# Patient Record
Sex: MALE | Race: BLACK OR AFRICAN AMERICAN | NOT HISPANIC OR LATINO | Employment: UNEMPLOYED | URBAN - METROPOLITAN AREA
[De-identification: names, ages, dates, MRNs, and addresses within clinical notes are randomized per-mention and may not be internally consistent; named-entity substitution may affect disease eponyms.]

---

## 2021-03-26 ENCOUNTER — IMMUNIZATION (OUTPATIENT)
Dept: PRIMARY CARE CLINIC | Facility: CLINIC | Age: 56
End: 2021-03-26
Payer: COMMERCIAL

## 2021-03-26 DIAGNOSIS — Z23 NEED FOR VACCINATION: Primary | ICD-10-CM

## 2021-03-26 PROCEDURE — 0011A COVID-19, MRNA, LNP-S, PF, 100 MCG/0.5 ML DOSE VACCINE: CPT | Mod: PBBFAC | Performed by: FAMILY MEDICINE

## 2021-04-24 ENCOUNTER — IMMUNIZATION (OUTPATIENT)
Dept: PRIMARY CARE CLINIC | Facility: CLINIC | Age: 56
End: 2021-04-24
Payer: COMMERCIAL

## 2021-04-24 DIAGNOSIS — Z23 NEED FOR VACCINATION: Primary | ICD-10-CM

## 2021-04-24 PROCEDURE — 0012A COVID-19, MRNA, LNP-S, PF, 100 MCG/0.5 ML DOSE VACCINE: CPT | Mod: PBBFAC | Performed by: EMERGENCY MEDICINE

## 2021-05-28 ENCOUNTER — RECORDS - HEALTHEAST (OUTPATIENT)
Dept: ADMINISTRATIVE | Facility: CLINIC | Age: 56
End: 2021-05-28

## 2021-10-14 ENCOUNTER — OFFICE VISIT (OUTPATIENT)
Dept: INTERNAL MEDICINE | Facility: CLINIC | Age: 56
End: 2021-10-14
Payer: COMMERCIAL

## 2021-10-14 VITALS
HEIGHT: 70 IN | WEIGHT: 255.31 LBS | BODY MASS INDEX: 36.55 KG/M2 | DIASTOLIC BLOOD PRESSURE: 62 MMHG | SYSTOLIC BLOOD PRESSURE: 119 MMHG | HEART RATE: 83 BPM | OXYGEN SATURATION: 97 %

## 2021-10-14 DIAGNOSIS — Z00.00 ENCOUNTER FOR ANNUAL PHYSICAL EXAM: Primary | ICD-10-CM

## 2021-10-14 DIAGNOSIS — E66.9 OBESITY (BMI 35.0-39.9 WITHOUT COMORBIDITY): ICD-10-CM

## 2021-10-14 DIAGNOSIS — E78.5 DYSLIPIDEMIA: ICD-10-CM

## 2021-10-14 DIAGNOSIS — Z12.11 COLON CANCER SCREENING: ICD-10-CM

## 2021-10-14 DIAGNOSIS — M25.521 RIGHT ELBOW PAIN: ICD-10-CM

## 2021-10-14 DIAGNOSIS — Z12.5 PROSTATE CANCER SCREENING: ICD-10-CM

## 2021-10-14 DIAGNOSIS — Z00.00 LABORATORY EXAMINATION ORDERED AS PART OF A ROUTINE GENERAL MEDICAL EXAMINATION: ICD-10-CM

## 2021-10-14 PROCEDURE — 3008F BODY MASS INDEX DOCD: CPT | Mod: CPTII,S$GLB,, | Performed by: INTERNAL MEDICINE

## 2021-10-14 PROCEDURE — 90471 FLU VACCINE (QUAD) GREATER THAN OR EQUAL TO 3YO PRESERVATIVE FREE IM: ICD-10-PCS | Mod: S$GLB,,, | Performed by: INTERNAL MEDICINE

## 2021-10-14 PROCEDURE — 3074F PR MOST RECENT SYSTOLIC BLOOD PRESSURE < 130 MM HG: ICD-10-PCS | Mod: CPTII,S$GLB,, | Performed by: INTERNAL MEDICINE

## 2021-10-14 PROCEDURE — 99999 PR PBB SHADOW E&M-EST. PATIENT-LVL III: ICD-10-PCS | Mod: PBBFAC,,, | Performed by: INTERNAL MEDICINE

## 2021-10-14 PROCEDURE — 3008F PR BODY MASS INDEX (BMI) DOCUMENTED: ICD-10-PCS | Mod: CPTII,S$GLB,, | Performed by: INTERNAL MEDICINE

## 2021-10-14 PROCEDURE — 1160F RVW MEDS BY RX/DR IN RCRD: CPT | Mod: CPTII,S$GLB,, | Performed by: INTERNAL MEDICINE

## 2021-10-14 PROCEDURE — 3078F DIAST BP <80 MM HG: CPT | Mod: CPTII,S$GLB,, | Performed by: INTERNAL MEDICINE

## 2021-10-14 PROCEDURE — 90686 IIV4 VACC NO PRSV 0.5 ML IM: CPT | Mod: S$GLB,,, | Performed by: INTERNAL MEDICINE

## 2021-10-14 PROCEDURE — 1160F PR REVIEW ALL MEDS BY PRESCRIBER/CLIN PHARMACIST DOCUMENTED: ICD-10-PCS | Mod: CPTII,S$GLB,, | Performed by: INTERNAL MEDICINE

## 2021-10-14 PROCEDURE — 99386 PR PREVENTIVE VISIT,NEW,40-64: ICD-10-PCS | Mod: 25,S$GLB,, | Performed by: INTERNAL MEDICINE

## 2021-10-14 PROCEDURE — 99386 PREV VISIT NEW AGE 40-64: CPT | Mod: 25,S$GLB,, | Performed by: INTERNAL MEDICINE

## 2021-10-14 PROCEDURE — 1159F MED LIST DOCD IN RCRD: CPT | Mod: CPTII,S$GLB,, | Performed by: INTERNAL MEDICINE

## 2021-10-14 PROCEDURE — 99999 PR PBB SHADOW E&M-EST. PATIENT-LVL III: CPT | Mod: PBBFAC,,, | Performed by: INTERNAL MEDICINE

## 2021-10-14 PROCEDURE — 90471 IMMUNIZATION ADMIN: CPT | Mod: S$GLB,,, | Performed by: INTERNAL MEDICINE

## 2021-10-14 PROCEDURE — 1159F PR MEDICATION LIST DOCUMENTED IN MEDICAL RECORD: ICD-10-PCS | Mod: CPTII,S$GLB,, | Performed by: INTERNAL MEDICINE

## 2021-10-14 PROCEDURE — 90686 FLU VACCINE (QUAD) GREATER THAN OR EQUAL TO 3YO PRESERVATIVE FREE IM: ICD-10-PCS | Mod: S$GLB,,, | Performed by: INTERNAL MEDICINE

## 2021-10-14 PROCEDURE — 3078F PR MOST RECENT DIASTOLIC BLOOD PRESSURE < 80 MM HG: ICD-10-PCS | Mod: CPTII,S$GLB,, | Performed by: INTERNAL MEDICINE

## 2021-10-14 PROCEDURE — 3074F SYST BP LT 130 MM HG: CPT | Mod: CPTII,S$GLB,, | Performed by: INTERNAL MEDICINE

## 2021-10-15 ENCOUNTER — LAB VISIT (OUTPATIENT)
Dept: LAB | Facility: HOSPITAL | Age: 56
End: 2021-10-15
Attending: INTERNAL MEDICINE
Payer: COMMERCIAL

## 2021-10-15 DIAGNOSIS — Z12.5 PROSTATE CANCER SCREENING: ICD-10-CM

## 2021-10-15 DIAGNOSIS — E66.9 OBESITY (BMI 35.0-39.9 WITHOUT COMORBIDITY): ICD-10-CM

## 2021-10-15 DIAGNOSIS — E78.5 DYSLIPIDEMIA: ICD-10-CM

## 2021-10-15 DIAGNOSIS — Z00.00 LABORATORY EXAMINATION ORDERED AS PART OF A ROUTINE GENERAL MEDICAL EXAMINATION: ICD-10-CM

## 2021-10-15 LAB
ALBUMIN SERPL BCP-MCNC: 3.7 G/DL (ref 3.5–5.2)
ALP SERPL-CCNC: 53 U/L (ref 55–135)
ALT SERPL W/O P-5'-P-CCNC: 26 U/L (ref 10–44)
ANION GAP SERPL CALC-SCNC: 11 MMOL/L (ref 8–16)
AST SERPL-CCNC: 19 U/L (ref 10–40)
BASOPHILS # BLD AUTO: 0.01 K/UL (ref 0–0.2)
BASOPHILS NFR BLD: 0.2 % (ref 0–1.9)
BILIRUB SERPL-MCNC: 0.7 MG/DL (ref 0.1–1)
BUN SERPL-MCNC: 15 MG/DL (ref 6–20)
CALCIUM SERPL-MCNC: 9.4 MG/DL (ref 8.7–10.5)
CHLORIDE SERPL-SCNC: 107 MMOL/L (ref 95–110)
CHOLEST SERPL-MCNC: 241 MG/DL (ref 120–199)
CHOLEST/HDLC SERPL: 5.7 {RATIO} (ref 2–5)
CO2 SERPL-SCNC: 22 MMOL/L (ref 23–29)
COMPLEXED PSA SERPL-MCNC: 0.51 NG/ML (ref 0–4)
CREAT SERPL-MCNC: 1.3 MG/DL (ref 0.5–1.4)
DIFFERENTIAL METHOD: ABNORMAL
EOSINOPHIL # BLD AUTO: 0.2 K/UL (ref 0–0.5)
EOSINOPHIL NFR BLD: 4.3 % (ref 0–8)
ERYTHROCYTE [DISTWIDTH] IN BLOOD BY AUTOMATED COUNT: 14 % (ref 11.5–14.5)
EST. GFR  (AFRICAN AMERICAN): >60 ML/MIN/1.73 M^2
EST. GFR  (NON AFRICAN AMERICAN): >60 ML/MIN/1.73 M^2
ESTIMATED AVG GLUCOSE: 114 MG/DL (ref 68–131)
GLUCOSE SERPL-MCNC: 95 MG/DL (ref 70–110)
HBA1C MFR BLD: 5.6 % (ref 4–5.6)
HCT VFR BLD AUTO: 42.9 % (ref 40–54)
HCV AB SERPL QL IA: NEGATIVE
HDLC SERPL-MCNC: 42 MG/DL (ref 40–75)
HDLC SERPL: 17.4 % (ref 20–50)
HGB BLD-MCNC: 13.5 G/DL (ref 14–18)
HIV 1+2 AB+HIV1 P24 AG SERPL QL IA: NEGATIVE
IMM GRANULOCYTES # BLD AUTO: 0.01 K/UL (ref 0–0.04)
IMM GRANULOCYTES NFR BLD AUTO: 0.2 % (ref 0–0.5)
LDLC SERPL CALC-MCNC: 176.2 MG/DL (ref 63–159)
LYMPHOCYTES # BLD AUTO: 2.1 K/UL (ref 1–4.8)
LYMPHOCYTES NFR BLD: 45.1 % (ref 18–48)
MCH RBC QN AUTO: 28.1 PG (ref 27–31)
MCHC RBC AUTO-ENTMCNC: 31.5 G/DL (ref 32–36)
MCV RBC AUTO: 89 FL (ref 82–98)
MONOCYTES # BLD AUTO: 0.4 K/UL (ref 0.3–1)
MONOCYTES NFR BLD: 8.6 % (ref 4–15)
NEUTROPHILS # BLD AUTO: 1.9 K/UL (ref 1.8–7.7)
NEUTROPHILS NFR BLD: 41.6 % (ref 38–73)
NONHDLC SERPL-MCNC: 199 MG/DL
NRBC BLD-RTO: 0 /100 WBC
PLATELET # BLD AUTO: 202 K/UL (ref 150–450)
PMV BLD AUTO: 11.3 FL (ref 9.2–12.9)
POTASSIUM SERPL-SCNC: 4.5 MMOL/L (ref 3.5–5.1)
PROT SERPL-MCNC: 6.8 G/DL (ref 6–8.4)
RBC # BLD AUTO: 4.81 M/UL (ref 4.6–6.2)
SODIUM SERPL-SCNC: 140 MMOL/L (ref 136–145)
TRIGL SERPL-MCNC: 114 MG/DL (ref 30–150)
TSH SERPL DL<=0.005 MIU/L-ACNC: 2.02 UIU/ML (ref 0.4–4)
WBC # BLD AUTO: 4.66 K/UL (ref 3.9–12.7)

## 2021-10-15 PROCEDURE — 80053 COMPREHEN METABOLIC PANEL: CPT | Performed by: INTERNAL MEDICINE

## 2021-10-15 PROCEDURE — 84153 ASSAY OF PSA TOTAL: CPT | Performed by: INTERNAL MEDICINE

## 2021-10-15 PROCEDURE — 80061 LIPID PANEL: CPT | Performed by: INTERNAL MEDICINE

## 2021-10-15 PROCEDURE — 85025 COMPLETE CBC W/AUTO DIFF WBC: CPT | Performed by: INTERNAL MEDICINE

## 2021-10-15 PROCEDURE — 87389 HIV-1 AG W/HIV-1&-2 AB AG IA: CPT | Performed by: INTERNAL MEDICINE

## 2021-10-15 PROCEDURE — 36415 COLL VENOUS BLD VENIPUNCTURE: CPT | Performed by: INTERNAL MEDICINE

## 2021-10-15 PROCEDURE — 84443 ASSAY THYROID STIM HORMONE: CPT | Performed by: INTERNAL MEDICINE

## 2021-10-15 PROCEDURE — 86803 HEPATITIS C AB TEST: CPT | Performed by: INTERNAL MEDICINE

## 2021-10-15 PROCEDURE — 83036 HEMOGLOBIN GLYCOSYLATED A1C: CPT | Performed by: INTERNAL MEDICINE

## 2021-11-19 ENCOUNTER — PATIENT MESSAGE (OUTPATIENT)
Dept: ENDOSCOPY | Facility: HOSPITAL | Age: 56
End: 2021-11-19
Payer: COMMERCIAL

## 2022-01-04 DIAGNOSIS — U07.1 COVID-19 VIRUS DETECTED: ICD-10-CM

## 2023-08-04 ENCOUNTER — HOSPITAL ENCOUNTER (OUTPATIENT)
Dept: RADIOLOGY | Facility: HOSPITAL | Age: 58
Discharge: HOME | End: 2023-08-04
Attending: INTERNAL MEDICINE
Payer: COMMERCIAL

## 2023-08-04 ENCOUNTER — OFFICE VISIT (OUTPATIENT)
Dept: INTERNAL MEDICINE | Facility: CLINIC | Age: 58
End: 2023-08-04
Payer: COMMERCIAL

## 2023-08-04 VITALS
WEIGHT: 257.94 LBS | BODY MASS INDEX: 36.93 KG/M2 | SYSTOLIC BLOOD PRESSURE: 120 MMHG | HEART RATE: 76 BPM | OXYGEN SATURATION: 96 % | DIASTOLIC BLOOD PRESSURE: 70 MMHG | HEIGHT: 70 IN

## 2023-08-04 DIAGNOSIS — Z12.11 SCREENING FOR COLON CANCER: ICD-10-CM

## 2023-08-04 DIAGNOSIS — Z00.00 ENCOUNTER FOR ANNUAL PHYSICAL EXAM: Primary | ICD-10-CM

## 2023-08-04 DIAGNOSIS — Z23 NEED FOR TDAP VACCINATION: ICD-10-CM

## 2023-08-04 DIAGNOSIS — Z01.89 LABORATORY EXAMINATION: ICD-10-CM

## 2023-08-04 DIAGNOSIS — R07.89 ATYPICAL CHEST PAIN: ICD-10-CM

## 2023-08-04 DIAGNOSIS — E78.2 MIXED HYPERLIPIDEMIA: ICD-10-CM

## 2023-08-04 DIAGNOSIS — M79.641 HAND PAIN, RIGHT: ICD-10-CM

## 2023-08-04 DIAGNOSIS — Z12.5 PROSTATE CANCER SCREENING: ICD-10-CM

## 2023-08-04 DIAGNOSIS — Z13.1 DIABETES MELLITUS SCREENING: ICD-10-CM

## 2023-08-04 PROCEDURE — 99999 PR PBB SHADOW E&M-EST. PATIENT-LVL IV: ICD-10-PCS | Mod: PBBFAC,,, | Performed by: INTERNAL MEDICINE

## 2023-08-04 PROCEDURE — 73130 X-RAY EXAM OF HAND: CPT | Mod: TC,RT

## 2023-08-04 PROCEDURE — 3008F PR BODY MASS INDEX (BMI) DOCUMENTED: ICD-10-PCS | Mod: CPTII,S$GLB,, | Performed by: INTERNAL MEDICINE

## 2023-08-04 PROCEDURE — 3078F DIAST BP <80 MM HG: CPT | Mod: CPTII,S$GLB,, | Performed by: INTERNAL MEDICINE

## 2023-08-04 PROCEDURE — 93010 EKG 12-LEAD: ICD-10-PCS | Mod: S$GLB,,, | Performed by: INTERNAL MEDICINE

## 2023-08-04 PROCEDURE — 1159F PR MEDICATION LIST DOCUMENTED IN MEDICAL RECORD: ICD-10-PCS | Mod: CPTII,S$GLB,, | Performed by: INTERNAL MEDICINE

## 2023-08-04 PROCEDURE — 93005 ELECTROCARDIOGRAM TRACING: CPT | Mod: S$GLB,,, | Performed by: INTERNAL MEDICINE

## 2023-08-04 PROCEDURE — 3044F HG A1C LEVEL LT 7.0%: CPT | Mod: CPTII,S$GLB,, | Performed by: INTERNAL MEDICINE

## 2023-08-04 PROCEDURE — 1159F MED LIST DOCD IN RCRD: CPT | Mod: CPTII,S$GLB,, | Performed by: INTERNAL MEDICINE

## 2023-08-04 PROCEDURE — 3078F PR MOST RECENT DIASTOLIC BLOOD PRESSURE < 80 MM HG: ICD-10-PCS | Mod: CPTII,S$GLB,, | Performed by: INTERNAL MEDICINE

## 2023-08-04 PROCEDURE — 3008F BODY MASS INDEX DOCD: CPT | Mod: CPTII,S$GLB,, | Performed by: INTERNAL MEDICINE

## 2023-08-04 PROCEDURE — 3044F PR MOST RECENT HEMOGLOBIN A1C LEVEL <7.0%: ICD-10-PCS | Mod: CPTII,S$GLB,, | Performed by: INTERNAL MEDICINE

## 2023-08-04 PROCEDURE — 3074F PR MOST RECENT SYSTOLIC BLOOD PRESSURE < 130 MM HG: ICD-10-PCS | Mod: CPTII,S$GLB,, | Performed by: INTERNAL MEDICINE

## 2023-08-04 PROCEDURE — 93005 EKG 12-LEAD: ICD-10-PCS | Mod: S$GLB,,, | Performed by: INTERNAL MEDICINE

## 2023-08-04 PROCEDURE — 93010 ELECTROCARDIOGRAM REPORT: CPT | Mod: S$GLB,,, | Performed by: INTERNAL MEDICINE

## 2023-08-04 PROCEDURE — 90715 TDAP VACCINE GREATER THAN OR EQUAL TO 7YO IM: ICD-10-PCS | Mod: S$GLB,,, | Performed by: INTERNAL MEDICINE

## 2023-08-04 PROCEDURE — 99396 PREV VISIT EST AGE 40-64: CPT | Mod: 25,S$GLB,, | Performed by: INTERNAL MEDICINE

## 2023-08-04 PROCEDURE — 73130 X-RAY EXAM OF HAND: CPT | Mod: 26,RT,, | Performed by: RADIOLOGY

## 2023-08-04 PROCEDURE — 90471 TDAP VACCINE GREATER THAN OR EQUAL TO 7YO IM: ICD-10-PCS | Mod: S$GLB,,, | Performed by: INTERNAL MEDICINE

## 2023-08-04 PROCEDURE — 73130 XR HAND COMPLETE 3 VIEW RIGHT: ICD-10-PCS | Mod: 26,RT,, | Performed by: RADIOLOGY

## 2023-08-04 PROCEDURE — 99999 PR PBB SHADOW E&M-EST. PATIENT-LVL IV: CPT | Mod: PBBFAC,,, | Performed by: INTERNAL MEDICINE

## 2023-08-04 PROCEDURE — 3074F SYST BP LT 130 MM HG: CPT | Mod: CPTII,S$GLB,, | Performed by: INTERNAL MEDICINE

## 2023-08-04 PROCEDURE — 99396 PR PREVENTIVE VISIT,EST,40-64: ICD-10-PCS | Mod: 25,S$GLB,, | Performed by: INTERNAL MEDICINE

## 2023-08-04 PROCEDURE — 90715 TDAP VACCINE 7 YRS/> IM: CPT | Mod: S$GLB,,, | Performed by: INTERNAL MEDICINE

## 2023-08-04 PROCEDURE — 90471 IMMUNIZATION ADMIN: CPT | Mod: S$GLB,,, | Performed by: INTERNAL MEDICINE

## 2023-08-04 RX ORDER — ROSUVASTATIN CALCIUM 5 MG/1
5 TABLET, COATED ORAL DAILY
Qty: 90 TABLET | Refills: 3 | Status: SHIPPED | OUTPATIENT
Start: 2023-08-04 | End: 2024-08-03

## 2023-08-04 RX ORDER — IBUPROFEN 200 MG
200 TABLET ORAL EVERY 8 HOURS PRN
COMMUNITY

## 2023-08-04 NOTE — PROGRESS NOTES
After obtaining consent, and per orders of Dr. Colt Will, injection of TDAP given by Margaux Han on the right deltoid. Patient instructed to remain in clinic for 15 minutes afterwards, and to report any adverse reaction to me immediately.

## 2023-08-04 NOTE — PROGRESS NOTES
"Subjective:       Patient ID: Jose Horton is a 57 y.o. male.    Chief Complaint: Annual Exam      HPI  Jose Horton is a 57 y.o. year old male with HLD presents for annual exam. Reports intermittent chest pain on left side, no aggravating / alleviating factors. Denies any associated shortness of breath, nausea, abdominal pain.     Also complaining of pain to mid palm of R hand, tightness which is intermittent, improves throughout the day.     Review of Systems   Constitutional:  Negative for activity change, appetite change, chills, fatigue, fever and unexpected weight change.   HENT:  Negative for congestion, rhinorrhea and sore throat.    Eyes:  Negative for visual disturbance.   Respiratory:  Negative for shortness of breath.    Cardiovascular:  Negative for chest pain.   Gastrointestinal:  Negative for abdominal pain, diarrhea, nausea and vomiting.   Genitourinary:  Negative for difficulty urinating and dysuria.   Musculoskeletal:  Negative for arthralgias, back pain and myalgias.   Skin:  Negative for color change and rash.   Neurological:  Negative for dizziness, weakness and headaches.         No past medical history on file.     Prior to Admission medications    Medication Sig Start Date End Date Taking? Authorizing Provider   ibuprofen (ADVIL,MOTRIN) 200 MG tablet Take 200 mg by mouth every 8 (eight) hours as needed for Pain.   Yes Provider, Historical   rosuvastatin (CRESTOR) 5 MG tablet Take 1 tablet (5 mg total) by mouth once daily. 8/4/23 8/3/24  Colt Will MD        Past medical history, surgical history, and family medical history reviewed and updated as appropriate.    Medications and allergies reviewed.     Objective:          Vitals:    08/04/23 1510   BP: 120/70   BP Location: Right arm   Patient Position: Sitting   Pulse: 76   SpO2: 96%   Weight: 117 kg (257 lb 15 oz)   Height: 5' 10" (1.778 m)     Body mass index is 37.01 kg/m².  Physical Exam  Constitutional:       General: He is " not in acute distress.     Appearance: He is well-developed.   HENT:      Head: Normocephalic and atraumatic.      Nose: Nose normal.   Eyes:      General: No scleral icterus.     Extraocular Movements: Extraocular movements intact.   Neck:      Thyroid: No thyromegaly.      Vascular: No JVD.      Trachea: No tracheal deviation.   Cardiovascular:      Rate and Rhythm: Normal rate and regular rhythm.      Heart sounds: Normal heart sounds. No murmur heard.     No friction rub. No gallop.   Pulmonary:      Effort: Pulmonary effort is normal. No respiratory distress.      Breath sounds: Normal breath sounds. No wheezing or rales.   Abdominal:      General: Bowel sounds are normal. There is no distension.      Palpations: Abdomen is soft. There is no mass.      Tenderness: There is no abdominal tenderness.   Musculoskeletal:         General: No tenderness. Normal range of motion.      Cervical back: Normal range of motion and neck supple.   Lymphadenopathy:      Cervical: No cervical adenopathy.   Skin:     General: Skin is warm and dry.      Findings: No rash.   Neurological:      Mental Status: He is alert and oriented to person, place, and time.      Cranial Nerves: No cranial nerve deficit.      Deep Tendon Reflexes: Reflexes normal.   Psychiatric:         Behavior: Behavior normal.         Lab Results   Component Value Date    WBC 4.66 10/15/2021    HGB 13.5 (L) 10/15/2021    HCT 42.9 10/15/2021     10/15/2021    CHOL 241 (H) 10/15/2021    TRIG 114 10/15/2021    HDL 42 10/15/2021    ALT 26 10/15/2021    AST 19 10/15/2021     10/15/2021    K 4.5 10/15/2021     10/15/2021    CREATININE 1.3 10/15/2021    BUN 15 10/15/2021    CO2 22 (L) 10/15/2021    TSH 2.017 10/15/2021    PSA 0.51 10/15/2021    HGBA1C 5.6 10/15/2021       Assessment:       1. Encounter for annual physical exam    2. Atypical chest pain    3. Mixed hyperlipidemia    4. Need for Tdap vaccination    5. Screening for colon cancer    6.  Prostate cancer screening    7. Laboratory examination    8. Diabetes mellitus screening    9. Hand pain, right          Plan:     Jose was seen today for annual exam.    Diagnoses and all orders for this visit:    Encounter for annual physical exam    Atypical chest pain  -     IN OFFICE EKG 12-LEAD (to Muse)    Mixed hyperlipidemia  -     rosuvastatin (CRESTOR) 5 MG tablet; Take 1 tablet (5 mg total) by mouth once daily.  -     TSH; Future  -     Lipid Panel; Future    Need for Tdap vaccination  -     (In Office Administered) Tdap Vaccine    Screening for colon cancer  -     Ambulatory referral/consult to Endo Procedure ; Future    Prostate cancer screening  -     PSA, Screening; Future    Laboratory examination  -     CBC Auto Differential; Future  -     Comprehensive Metabolic Panel; Future  -     TSH; Future    Diabetes mellitus screening  -     Hemoglobin A1C; Future    Hand pain, right  -     X-Ray Hand Complete Right; Future      The 10-year ASCVD risk score (Mirlande GARCIA, et al., 2019) is: 7.5%    Values used to calculate the score:      Age: 57 years      Sex: Male      Is Non- : Yes      Diabetic: No      Tobacco smoker: No      Systolic Blood Pressure: 120 mmHg      Is BP treated: No      HDL Cholesterol: 42 mg/dL      Total Cholesterol: 241 mg/dL      Health maintenance reviewed with patient.     Follow up in about 6 months (around 2/4/2024).    Colt Will MD  Internal Medicine / Primary Care  [facility]  8/4/2023

## 2023-08-04 NOTE — PATIENT INSTRUCTIONS
"Schedule fasting labwork  X-ray today  EKG today  Tetanus shot today      This may help reduce muscle aches while taking "statin" cholesterol medication.     Start rosuvastatin (Crestor) 5 mg daily to reduce cholesterol and risk of heart attack.   Start aspirin 81 mg daily.    Colonoscopy - a colonoscopy has been ordered for you. In order to schedule this appointment, please call [phone].     Return to clinic in 6 months or sooner if needed.      "

## 2023-08-05 ENCOUNTER — LAB VISIT (OUTPATIENT)
Dept: LAB | Facility: HOSPITAL | Age: 58
End: 2023-08-05
Attending: INTERNAL MEDICINE
Payer: COMMERCIAL

## 2023-08-05 DIAGNOSIS — Z13.1 DIABETES MELLITUS SCREENING: ICD-10-CM

## 2023-08-05 DIAGNOSIS — E78.2 MIXED HYPERLIPIDEMIA: ICD-10-CM

## 2023-08-05 DIAGNOSIS — Z01.89 LABORATORY EXAMINATION: ICD-10-CM

## 2023-08-05 DIAGNOSIS — Z12.5 PROSTATE CANCER SCREENING: ICD-10-CM

## 2023-08-05 LAB
ALBUMIN SERPL BCP-MCNC: 3.7 G/DL (ref 3.5–5.2)
ALP SERPL-CCNC: 55 U/L (ref 55–135)
ALT SERPL W/O P-5'-P-CCNC: 18 U/L (ref 10–44)
ANION GAP SERPL CALC-SCNC: 10 MMOL/L (ref 8–16)
AST SERPL-CCNC: 15 U/L (ref 10–40)
BASOPHILS # BLD AUTO: 0.01 K/UL (ref 0–0.2)
BASOPHILS NFR BLD: 0.3 % (ref 0–1.9)
BILIRUB SERPL-MCNC: 0.6 MG/DL (ref 0.1–1)
BUN SERPL-MCNC: 13 MG/DL (ref 6–20)
CALCIUM SERPL-MCNC: 9.1 MG/DL (ref 8.7–10.5)
CHLORIDE SERPL-SCNC: 108 MMOL/L (ref 95–110)
CHOLEST SERPL-MCNC: 237 MG/DL (ref 120–199)
CHOLEST/HDLC SERPL: 6.8 {RATIO} (ref 2–5)
CO2 SERPL-SCNC: 23 MMOL/L (ref 23–29)
COMPLEXED PSA SERPL-MCNC: 0.66 NG/ML (ref 0–4)
CREAT SERPL-MCNC: 1.3 MG/DL (ref 0.5–1.4)
DIFFERENTIAL METHOD: ABNORMAL
EOSINOPHIL # BLD AUTO: 0.1 K/UL (ref 0–0.5)
EOSINOPHIL NFR BLD: 3.6 % (ref 0–8)
ERYTHROCYTE [DISTWIDTH] IN BLOOD BY AUTOMATED COUNT: 13.9 % (ref 11.5–14.5)
EST. GFR  (NO RACE VARIABLE): >60 ML/MIN/1.73 M^2
ESTIMATED AVG GLUCOSE: 111 MG/DL (ref 68–131)
GLUCOSE SERPL-MCNC: 90 MG/DL (ref 70–110)
HBA1C MFR BLD: 5.5 % (ref 4–5.6)
HCT VFR BLD AUTO: 40.8 % (ref 40–54)
HDLC SERPL-MCNC: 35 MG/DL (ref 40–75)
HDLC SERPL: 14.8 % (ref 20–50)
HGB BLD-MCNC: 13.2 G/DL (ref 14–18)
IMM GRANULOCYTES # BLD AUTO: 0.01 K/UL (ref 0–0.04)
IMM GRANULOCYTES NFR BLD AUTO: 0.3 % (ref 0–0.5)
LDLC SERPL CALC-MCNC: 174.2 MG/DL (ref 63–159)
LYMPHOCYTES # BLD AUTO: 1.3 K/UL (ref 1–4.8)
LYMPHOCYTES NFR BLD: 32.4 % (ref 18–48)
MCH RBC QN AUTO: 28.4 PG (ref 27–31)
MCHC RBC AUTO-ENTMCNC: 32.4 G/DL (ref 32–36)
MCV RBC AUTO: 88 FL (ref 82–98)
MONOCYTES # BLD AUTO: 0.2 K/UL (ref 0.3–1)
MONOCYTES NFR BLD: 5.9 % (ref 4–15)
NEUTROPHILS # BLD AUTO: 2.2 K/UL (ref 1.8–7.7)
NEUTROPHILS NFR BLD: 57.5 % (ref 38–73)
NONHDLC SERPL-MCNC: 202 MG/DL
NRBC BLD-RTO: 0 /100 WBC
PLATELET # BLD AUTO: 179 K/UL (ref 150–450)
PMV BLD AUTO: 11.3 FL (ref 9.2–12.9)
POTASSIUM SERPL-SCNC: 4.2 MMOL/L (ref 3.5–5.1)
PROT SERPL-MCNC: 6.7 G/DL (ref 6–8.4)
RBC # BLD AUTO: 4.65 M/UL (ref 4.6–6.2)
SODIUM SERPL-SCNC: 141 MMOL/L (ref 136–145)
TRIGL SERPL-MCNC: 139 MG/DL (ref 30–150)
TSH SERPL DL<=0.005 MIU/L-ACNC: 2.24 UIU/ML (ref 0.4–4)
WBC # BLD AUTO: 3.89 K/UL (ref 3.9–12.7)

## 2023-08-05 PROCEDURE — 83036 HEMOGLOBIN GLYCOSYLATED A1C: CPT | Performed by: INTERNAL MEDICINE

## 2023-08-05 PROCEDURE — 80053 COMPREHEN METABOLIC PANEL: CPT | Performed by: INTERNAL MEDICINE

## 2023-08-05 PROCEDURE — 85025 COMPLETE CBC W/AUTO DIFF WBC: CPT | Performed by: INTERNAL MEDICINE

## 2023-08-05 PROCEDURE — 84443 ASSAY THYROID STIM HORMONE: CPT | Performed by: INTERNAL MEDICINE

## 2023-08-05 PROCEDURE — 36415 COLL VENOUS BLD VENIPUNCTURE: CPT | Performed by: INTERNAL MEDICINE

## 2023-08-05 PROCEDURE — 84153 ASSAY OF PSA TOTAL: CPT | Performed by: INTERNAL MEDICINE

## 2023-08-05 PROCEDURE — 80061 LIPID PANEL: CPT | Performed by: INTERNAL MEDICINE

## 2023-08-08 ENCOUNTER — TELEPHONE (OUTPATIENT)
Dept: INTERNAL MEDICINE | Facility: CLINIC | Age: 58
End: 2023-08-08
Payer: COMMERCIAL

## 2023-08-08 NOTE — TELEPHONE ENCOUNTER
----- Message from Margaux Han MA sent at 8/8/2023  8:27 AM CDT -----    ----- Message -----  From: Colt Will MD  Sent: 8/7/2023   5:12 PM CDT  To: [facility] Staff    X-ray of hand is normal. May be just a sprain. If worsens, will refer to orthopedics.

## 2023-08-28 ENCOUNTER — TELEPHONE (OUTPATIENT)
Dept: ENDOSCOPY | Facility: HOSPITAL | Age: 58
End: 2023-08-28
Payer: COMMERCIAL

## 2023-08-28 VITALS — HEIGHT: 70 IN | WEIGHT: 257 LBS | BODY MASS INDEX: 36.79 KG/M2

## 2023-08-28 NOTE — TELEPHONE ENCOUNTER
Contacted patient to schedule Colonoscopy. Patient unable to commit to date at the moment. Patient requests to call back to schedule. Patient given direct line to call to schedule when ready.

## 2023-08-30 ENCOUNTER — TELEPHONE (OUTPATIENT)
Dept: SURGERY | Facility: CLINIC | Age: 58
End: 2023-08-30
Payer: COMMERCIAL

## 2023-08-30 DIAGNOSIS — Z12.11 SCREENING FOR COLON CANCER: Primary | ICD-10-CM

## 2023-08-30 RX ORDER — SODIUM CHLORIDE 0.9 % (FLUSH) 0.9 %
3 SYRINGE (ML) INJECTION
Status: CANCELLED | OUTPATIENT
Start: 2023-08-30

## 2023-08-30 NOTE — TELEPHONE ENCOUNTER
.This patient called with a request to be scheduled for  a Colonoscopy on a sooner date than the first available date at [facility]. Patient verbally consented to a Colonoscopy and requested to be scheduled for a Colonoscopy on 09/08/2023 Patient was advised a designated  is required on the day of the Colonoscopy to drive the patient home and the  must be at least. 18 years old.Colonoscopy Prep instructions were thoroughly explained and discussed with the patient.It was emphasized, and reiterated to the patient, to please not to follow the bowel prep instructions that comes with the bowel prep package.However, to please follow the prep instructions that will be received in the mail,or via the [employer] portal, or by both modes of delivery, which ever method of delivery the patient prefers,from the [facility]   Patient acknowledges understanding Prep instructions as explained and discussed on the phone.. Patient was advised the Colonoscopy Prep instructions discussed and explained on the phone,are being mailed out to the patient's verified address on file,or put onto the [employer] portal,or both methods of delivery, whichever the patient prefers. Patient's address on file was verified with the patient for accuracy of mailing. Patient's medications on file was reviewed with the patient for accuracy of information. Patient denies taking  any other medications other than those listed and verified on medication profile.Patient was explained the Colonoscopy will be performed here at [facility]. Patient was further explained the Pre-Op will call one day prior to the procedure date, to discuss Pre-Op instructions;and what time to report for the Colonoscopy. The patient was given the opportunity to ask any questions about the Colonoscopy.

## 2023-08-30 NOTE — TELEPHONE ENCOUNTER
The patient has been advised the Colonoscopy Prep Kit will be ordered from the patient's verified preferred pharmacy on file. The medication can  be picked up by the patient, or the patient's designated representative.The patient was further explained the Colonoscopy Prep instructions will be mailed to the patient verified mailing address on file, or put onto the [employer] portal, whichever method of delivery the patient prefers.Additionally this patient was informed,not to follow the instructions that comes with the bowel prep medication. However, the patient was instructed to please follow the Colonoscopy Bowel Prep instructions that's being provided by the . The patient was asked to please to follow the Colonoscopy Prep instructions being provided as precisely,and  meticulously as possible.The patient was advised you  will receive a follow up phone call to summarize the Colonoscopy Prep instructions prior to the scheduled Colonoscopy procedure date. At this time the patient will be given an opportunity to ask any questions regarding the Colonoscopy procedure, and it's associated Bowel Prep instructions.

## 2023-08-31 RX ORDER — SODIUM, POTASSIUM,MAG SULFATES 17.5-3.13G
1 SOLUTION, RECONSTITUTED, ORAL ORAL DAILY
Qty: 1 KIT | Refills: 0 | Status: SHIPPED | OUTPATIENT
Start: 2023-08-31 | End: 2023-09-02

## 2024-06-10 ENCOUNTER — PATIENT MESSAGE (OUTPATIENT)
Dept: INTERNAL MEDICINE | Facility: CLINIC | Age: 59
End: 2024-06-10
Payer: COMMERCIAL

## 2024-12-24 ENCOUNTER — HOSPITAL ENCOUNTER (OUTPATIENT)
Dept: RADIOLOGY | Facility: HOSPITAL | Age: 59
Discharge: HOME | End: 2024-12-24
Payer: COMMERCIAL

## 2024-12-24 ENCOUNTER — TELEPHONE (OUTPATIENT)
Dept: INTERNAL MEDICINE | Facility: CLINIC | Age: 59
End: 2024-12-24

## 2024-12-24 ENCOUNTER — OFFICE VISIT (OUTPATIENT)
Dept: INTERNAL MEDICINE | Facility: CLINIC | Age: 59
End: 2024-12-24
Payer: COMMERCIAL

## 2024-12-24 VITALS
BODY MASS INDEX: 37.67 KG/M2 | SYSTOLIC BLOOD PRESSURE: 122 MMHG | OXYGEN SATURATION: 98 % | DIASTOLIC BLOOD PRESSURE: 78 MMHG | HEART RATE: 88 BPM | WEIGHT: 262.56 LBS

## 2024-12-24 DIAGNOSIS — M54.6 ACUTE RIGHT-SIDED THORACIC BACK PAIN: ICD-10-CM

## 2024-12-24 DIAGNOSIS — Z23 NEED FOR PNEUMOCOCCAL 20-VALENT CONJUGATE VACCINATION: ICD-10-CM

## 2024-12-24 DIAGNOSIS — R10.2 SUPRAPUBIC PAIN: ICD-10-CM

## 2024-12-24 DIAGNOSIS — M25.561 ACUTE PAIN OF RIGHT KNEE: ICD-10-CM

## 2024-12-24 DIAGNOSIS — M54.6 ACUTE RIGHT-SIDED THORACIC BACK PAIN: Primary | ICD-10-CM

## 2024-12-24 DIAGNOSIS — J30.9 ALLERGIC RHINITIS, UNSPECIFIED SEASONALITY, UNSPECIFIED TRIGGER: ICD-10-CM

## 2024-12-24 LAB
BILIRUB SERPL-MCNC: NEGATIVE MG/DL
BLOOD URINE, POC: NORMAL
CLARITY, POC UA: NORMAL
COLOR, POC UA: NORMAL
GLUCOSE UR QL STRIP: NEGATIVE
KETONES UR QL STRIP: NEGATIVE
LEUKOCYTE ESTERASE URINE, POC: NEGATIVE
NITRITE, POC UA: NEGATIVE
PH, POC UA: 5.5
PROTEIN, POC: NEGATIVE
SPECIFIC GRAVITY, POC UA: 1.02
UROBILINOGEN, POC UA: 0.2

## 2024-12-24 PROCEDURE — 3074F SYST BP LT 130 MM HG: CPT | Mod: CPTII,S$GLB,,

## 2024-12-24 PROCEDURE — 3078F DIAST BP <80 MM HG: CPT | Mod: CPTII,S$GLB,,

## 2024-12-24 PROCEDURE — 81002 URINALYSIS NONAUTO W/O SCOPE: CPT | Mod: S$GLB,,,

## 2024-12-24 PROCEDURE — 72080 X-RAY EXAM THORACOLMB 2/> VW: CPT | Mod: 26,,, | Performed by: STUDENT IN AN ORGANIZED HEALTH CARE EDUCATION/TRAINING PROGRAM

## 2024-12-24 PROCEDURE — 90670 PCV13 VACCINE IM: CPT | Mod: S$GLB,,,

## 2024-12-24 PROCEDURE — 90471 IMMUNIZATION ADMIN: CPT | Mod: S$GLB,,,

## 2024-12-24 PROCEDURE — 99214 OFFICE O/P EST MOD 30 MIN: CPT | Mod: 25,S$GLB,,

## 2024-12-24 PROCEDURE — 1159F MED LIST DOCD IN RCRD: CPT | Mod: CPTII,S$GLB,,

## 2024-12-24 PROCEDURE — 99999 PR PBB SHADOW E&M-EST. PATIENT-LVL III: CPT | Mod: PBBFAC,,,

## 2024-12-24 PROCEDURE — 73562 X-RAY EXAM OF KNEE 3: CPT | Mod: 26,RT,, | Performed by: RADIOLOGY

## 2024-12-24 PROCEDURE — 72080 X-RAY EXAM THORACOLMB 2/> VW: CPT | Mod: TC

## 2024-12-24 PROCEDURE — 73562 X-RAY EXAM OF KNEE 3: CPT | Mod: TC,RT

## 2024-12-24 PROCEDURE — 3008F BODY MASS INDEX DOCD: CPT | Mod: CPTII,S$GLB,,

## 2024-12-24 RX ORDER — CETIRIZINE HYDROCHLORIDE 10 MG/1
10 TABLET ORAL DAILY
Qty: 30 TABLET | Refills: 3 | Status: SHIPPED | OUTPATIENT
Start: 2024-12-24 | End: 2025-12-24

## 2024-12-24 NOTE — PROGRESS NOTES
INTERNAL MEDICINE URGENT CARE NOTE    CHIEF COMPLAINT     Jose presents today for knee pain, back pain, and congestion.    HPI     MUSCULOSKELETAL:  He reports right knee pain that started 3-4 months ago, rated 4-5/10 and described as sharp, located on the top and sides of the knee. Pain worsens with sitting still, in the morning, and when driving, but improves with walking and ibuprofen. He takes ibuprofen every other day. He denies swelling, numbness, tingling, or radiating pain. He also reports back pain that started a couple months ago and has been progressively worsening. The back pain is described as stabbing in the right thoracic area and sometimes wakes him from sleep. Pain worsens when lying down and improves with sitting. He has a history of back injury at age 9. There is no associated saddle anesthesia, fecal incontinence, bilateral lower extremity weakness, numbness, tingling, radiating pain, fever, unexplained weight loss, night sweates, or dysuria    NEUROLOGICAL:  He experiences ongoing numbness in the right pinky and ring finger without associated weakness.He does not feel this has to addressed during this visit.    RESPIRATORY:  He reports congestion that began after Thanksgiving, initially with cough which has resolved. Congestion persists with morning mucus production, though symptoms are gradually improving. He reports occasional audible wheezing at rest but denies feeling the wheezing. He denies smoking history, ear pain, sinus pain, or sinus pressure.    SEXUAL HEALTH:  He reports no longer experiencing morning erections.    HYPERLIPIDEMIA:  He was previously prescribed Crestor for cholesterol management but never took the medication, preferring to manage cholesterol through dietary modifications due to concerns about long-term medication dependency. He admits to inconsistent adherence to dietary changes.    GENITOURINARY:  He denies any urinary symptoms.    ROS:  General: -fever, -chills,  -fatigue, -weight gain, -weight loss  Eyes: -vision changes, -redness, -discharge  ENT: -ear pain, +nasal congestion, -sore throat  Cardiovascular: -chest pain, -palpitations, -lower extremity edema  Respiratory: -cough, -shortness of breath, +wheezing  Gastrointestinal: -abdominal pain, -nausea, -vomiting, -diarrhea, -constipation, -blood in stool  Genitourinary: -dysuria, -hematuria, -frequency, -urgency, -incomplete bladder emptying, +mild suprapubic discomfort  Musculoskeletal: +knee joint pain, -muscle pain, +back pain  Skin: -rash, -lesion  Neurological: -headache, -dizziness, -numbness, -tingling  Psychiatric: -anxiety, -depression, -sleep difficulty     Past Medical History:  Past Medical History:   Diagnosis Date    High cholesterol      Home Medications:  Prior to Admission medications    Medication Sig Start Date End Date Taking? Authorizing Provider   ibuprofen (ADVIL,MOTRIN) 200 MG tablet Take 200 mg by mouth every 8 (eight) hours as needed for Pain.    Provider, Historical   rosuvastatin (CRESTOR) 5 MG tablet Take 1 tablet (5 mg total) by mouth once daily. 8/4/23 8/3/24  Colt Will MD     PHYSICAL EXAM     General: No acute distress. Well-developed. Well-nourished.  Eyes: EOMI. Sclerae anicteric.  HENT: Normocephalic. Atraumatic. Nares patent. Moist oral mucosa. Throat appears normal. Erythematous nasal turbinates  Ears: Bilateral TMs clear. Bilateral EACs clear.  Cardiovascular: Regular rate. Regular rhythm. No murmurs. No rubs. No gallops. Normal S1, S2.  Respiratory: Normal respiratory effort. Clear to auscultation bilaterally. No rales. No rhonchi. No wheezing. Normal lung sounds.  Abdomen: Soft. Tenderness in lower abdomen. Non-distended. Normoactive bowel sounds. No CVA tenderness  Musculoskeletal: No obvious deformity. .R knee without swelling, erythema, bruising, deformity, atrophy, tenderness, warmth or crepitus. Full ROM  R thoracic back- without swelling, erythema, bruising, deformity,  tenderness, or warmth.   Extremities: No lower extremity edema.  Neurological: Alert & oriented x3. No slurred speech. Normal gait.  Psychiatric: Normal mood. Normal affect. Good insight. Good judgment.  Skin: Warm. Dry. No rash.        /78   Pulse 88   Wt 119.1 kg (262 lb 9.1 oz)   SpO2 98%   BMI 37.67 kg/m²     ASSESSMENT/PLAN     IMPRESSION:  - Suspect knee osteoarthritis due to gradual onset and clicking; considering degenerative changes vs. meniscal injury  - Lumbar/thoracic back pain likely due to thoracic strain/sprain or degenerative changes, possibly related to previous childhood injury. Also considered vertebral compression or disc herniation.  - Ongoing nasal congestion, likely allergic rhinitis after resolved URI  - Ruled out kidney involvement in back pain based on physical exam and POCT urine  - Considering physical therapy for knee and back pain pending imaging results    RIGHT KNEE  PAIN:  - X-ray of right knee ordered.  - Explained potential benefits of compression and ice therapy for knee pain.  - Continued ibuprofen as needed for pain relief.  - Diet and exercise discussed for weight management    THORACIC BACK PAIN:  - X-ray of lumbar/thoracic spine ordered.  - POCT urine    CHRONIC NASOPHARYNGITIS:  - Started Zyrtec for nasal congestion;     IMMUNIZATION:  - Pneumococcal vaccine administered in office.    FOLLOW-UP:  - Follow up for full physical exam, including discussion of right hand symptoms, erectile dysfunction concerns, and labs  - Follow up for review of x-ray results and potential referral to physical therapy.          Patient education provided from [facility]. Patient was counseled on when and how to seek emergent care.   This note was generated with the assistance of ambient listening technology. Verbal consent was obtained by the patient and accompanying visitor(s) for the recording of patient appointment to facilitate this note. I attest to having reviewed and edited the  generated note for accuracy, though some syntax or spelling errors may persist. Please contact the author of this note for any clarification.       Viktoria NATION, LYNDA, FNP-c   Department of Internal Medicine - [facility]  7:32 AM

## 2024-12-27 ENCOUNTER — OFFICE VISIT (OUTPATIENT)
Dept: INTERNAL MEDICINE | Facility: CLINIC | Age: 59
End: 2024-12-27
Payer: COMMERCIAL

## 2024-12-27 VITALS
OXYGEN SATURATION: 96 % | DIASTOLIC BLOOD PRESSURE: 78 MMHG | WEIGHT: 259.69 LBS | HEART RATE: 90 BPM | SYSTOLIC BLOOD PRESSURE: 126 MMHG | HEIGHT: 70 IN | BODY MASS INDEX: 37.18 KG/M2

## 2024-12-27 DIAGNOSIS — E66.01 SEVERE OBESITY (BMI 35.0-39.9) WITH COMORBIDITY: ICD-10-CM

## 2024-12-27 DIAGNOSIS — Z13.1 DIABETES MELLITUS SCREENING: ICD-10-CM

## 2024-12-27 DIAGNOSIS — Z00.00 ENCOUNTER FOR ANNUAL PHYSICAL EXAM: Primary | ICD-10-CM

## 2024-12-27 DIAGNOSIS — G56.21 ULNAR NEUROPATHY OF RIGHT UPPER EXTREMITY: ICD-10-CM

## 2024-12-27 DIAGNOSIS — E78.2 MIXED HYPERLIPIDEMIA: ICD-10-CM

## 2024-12-27 DIAGNOSIS — Z12.11 SCREENING FOR COLON CANCER: ICD-10-CM

## 2024-12-27 DIAGNOSIS — Z12.5 PROSTATE CANCER SCREENING: ICD-10-CM

## 2024-12-27 DIAGNOSIS — Z01.89 LABORATORY EXAMINATION: ICD-10-CM

## 2024-12-27 PROBLEM — E66.9 OBESITY (BMI 35.0-39.9 WITHOUT COMORBIDITY): Status: RESOLVED | Noted: 2021-10-14 | Resolved: 2024-12-27

## 2024-12-27 PROCEDURE — 99999 PR PBB SHADOW E&M-EST. PATIENT-LVL IV: CPT | Mod: PBBFAC,,, | Performed by: INTERNAL MEDICINE

## 2024-12-27 RX ORDER — ROSUVASTATIN CALCIUM 5 MG/1
5 TABLET, COATED ORAL DAILY
Qty: 90 TABLET | Refills: 3 | Status: SHIPPED | OUTPATIENT
Start: 2024-12-27 | End: 2025-12-27

## 2024-12-27 NOTE — PATIENT INSTRUCTIONS
Start aspirin 81 mg daily  Start rosuvastatin 5 mg daily    Schedule fasting labwork  Schedule EMG/NCS (electromyelogram / nerve conduction study)  Schedule neurology appointment follow up after EMG/NCS.    Return to clinic in 1 year or sooner if needed.

## 2024-12-27 NOTE — PROGRESS NOTES
"Subjective:       Patient ID: Jose Horton is a 59 y.o. male.    Chief Complaint: Annual Exam      HPI  Jose Horton is a 59 y.o. year old male with HLD, severe obesity presents for annual exam. On going "cold" thing since thanksgiving this year.     Review of Systems   Constitutional:  Negative for activity change, appetite change, chills, fatigue, fever and unexpected weight change.   HENT:  Negative for congestion, rhinorrhea and sore throat.    Eyes:  Negative for visual disturbance.   Respiratory:  Negative for shortness of breath.    Cardiovascular:  Negative for chest pain.   Gastrointestinal:  Negative for abdominal pain, diarrhea, nausea and vomiting.   Genitourinary:  Negative for difficulty urinating and dysuria.   Musculoskeletal:  Negative for arthralgias, back pain and myalgias.   Skin:  Negative for color change and rash.   Neurological:  Negative for dizziness, weakness and headaches.         Past Medical History:   Diagnosis Date    High cholesterol         Prior to Admission medications    Medication Sig Start Date End Date Taking? Authorizing Provider   cetirizine (ZYRTEC) 10 MG tablet Take 1 tablet (10 mg total) by mouth once daily. 12/24/24 12/24/25  Viktoria Matthews NP-C   ibuprofen (ADVIL,MOTRIN) 200 MG tablet Take 200 mg by mouth every 8 (eight) hours as needed for Pain.    Provider, Historical        Past medical history, surgical history, and family medical history reviewed and updated as appropriate.    Medications and allergies reviewed.     Objective:          Vitals:    12/27/24 1420   BP: 126/78   BP Location: Right arm   Patient Position: Sitting   Pulse: 90   SpO2: 96%   Weight: 117.8 kg (259 lb 11.2 oz)   Height: 5' 10" (1.778 m)     Body mass index is 37.26 kg/m².  Physical Exam  Constitutional:       General: He is not in acute distress.     Appearance: He is well-developed.   HENT:      Head: Normocephalic and atraumatic.      Nose: Nose normal.   Eyes:      General: No " scleral icterus.     Extraocular Movements: Extraocular movements intact.   Neck:      Thyroid: No thyromegaly.      Vascular: No JVD.      Trachea: No tracheal deviation.   Cardiovascular:      Rate and Rhythm: Normal rate and regular rhythm.      Heart sounds: Normal heart sounds. No murmur heard.     No friction rub. No gallop.   Pulmonary:      Effort: Pulmonary effort is normal. No respiratory distress.      Breath sounds: Normal breath sounds. No wheezing or rales.   Abdominal:      General: Bowel sounds are normal. There is no distension.      Palpations: Abdomen is soft. There is no mass.      Tenderness: There is no abdominal tenderness.   Musculoskeletal:         General: No tenderness. Normal range of motion.      Cervical back: Normal range of motion and neck supple.   Lymphadenopathy:      Cervical: No cervical adenopathy.   Skin:     General: Skin is warm and dry.      Findings: No rash.   Neurological:      Mental Status: He is alert and oriented to person, place, and time.      Cranial Nerves: No cranial nerve deficit.      Deep Tendon Reflexes: Reflexes normal.   Psychiatric:         Behavior: Behavior normal.         Lab Results   Component Value Date    WBC 3.89 (L) 08/05/2023    HGB 13.2 (L) 08/05/2023    HCT 40.8 08/05/2023     08/05/2023    CHOL 237 (H) 08/05/2023    TRIG 139 08/05/2023    HDL 35 (L) 08/05/2023    ALT 18 08/05/2023    AST 15 08/05/2023     08/05/2023    K 4.2 08/05/2023     08/05/2023    CREATININE 1.3 08/05/2023    BUN 13 08/05/2023    CO2 23 08/05/2023    TSH 2.240 08/05/2023    PSA 0.66 08/05/2023    HGBA1C 5.5 08/05/2023       Assessment:       1. Encounter for annual physical exam    2. Mixed hyperlipidemia    3. Laboratory examination    4. Severe obesity (BMI 35.0-39.9) with comorbidity    5. Prostate cancer screening    6. Diabetes mellitus screening    7. Screening for colon cancer due 2033    8. Ulnar neuropathy of right upper extremity           Plan:     Jose was seen today for annual exam.    Diagnoses and all orders for this visit:    Encounter for annual physical exam    Mixed hyperlipidemia  -     Comprehensive Metabolic Panel; Future  -     TSH; Future  -     Lipid Panel; Future  -     rosuvastatin (CRESTOR) 5 MG tablet; Take 1 tablet (5 mg total) by mouth once daily.    Laboratory examination  -     CBC Auto Differential; Future  -     Comprehensive Metabolic Panel; Future  -     TSH; Future  -     Hemoglobin A1C; Future  -     Lipid Panel; Future    Severe obesity (BMI 35.0-39.9) with comorbidity    Prostate cancer screening  -     PSA, Screening; Future    Diabetes mellitus screening  -     Hemoglobin A1C; Future    Screening for colon cancer due 2033    Ulnar neuropathy of right upper extremity  -     EMG W/ ULTRASOUND AND NERVE CONDUCTION TEST 1 Extremity; Future  -     Ambulatory referral/consult to Neurology; Future    Benign physical examination, no issues identified. Will obtain routine labwork and age appropriate health screenings.     Health maintenance reviewed with patient.     Follow up in about 1 year (around 12/27/2025).    Colt Will MD  Internal Medicine / Primary Care  [facility]  12/27/2024

## 2025-02-11 ENCOUNTER — OFFICE VISIT (OUTPATIENT)
Dept: NEUROLOGY | Facility: CLINIC | Age: 60
End: 2025-02-11
Payer: COMMERCIAL

## 2025-02-11 VITALS
DIASTOLIC BLOOD PRESSURE: 85 MMHG | HEIGHT: 70 IN | SYSTOLIC BLOOD PRESSURE: 137 MMHG | HEART RATE: 80 BPM | WEIGHT: 266.81 LBS | BODY MASS INDEX: 38.2 KG/M2

## 2025-02-11 DIAGNOSIS — G56.21 ULNAR NEUROPATHY OF RIGHT UPPER EXTREMITY: ICD-10-CM

## 2025-02-11 PROCEDURE — 99999 PR PBB SHADOW E&M-EST. PATIENT-LVL III: CPT | Mod: PBBFAC,,, | Performed by: STUDENT IN AN ORGANIZED HEALTH CARE EDUCATION/TRAINING PROGRAM

## 2025-02-11 PROCEDURE — 3008F BODY MASS INDEX DOCD: CPT | Mod: CPTII,S$GLB,, | Performed by: STUDENT IN AN ORGANIZED HEALTH CARE EDUCATION/TRAINING PROGRAM

## 2025-02-11 PROCEDURE — 1159F MED LIST DOCD IN RCRD: CPT | Mod: CPTII,S$GLB,, | Performed by: STUDENT IN AN ORGANIZED HEALTH CARE EDUCATION/TRAINING PROGRAM

## 2025-02-11 PROCEDURE — 3079F DIAST BP 80-89 MM HG: CPT | Mod: CPTII,S$GLB,, | Performed by: STUDENT IN AN ORGANIZED HEALTH CARE EDUCATION/TRAINING PROGRAM

## 2025-02-11 PROCEDURE — 99202 OFFICE O/P NEW SF 15 MIN: CPT | Mod: S$GLB,,, | Performed by: STUDENT IN AN ORGANIZED HEALTH CARE EDUCATION/TRAINING PROGRAM

## 2025-02-11 PROCEDURE — 3075F SYST BP GE 130 - 139MM HG: CPT | Mod: CPTII,S$GLB,, | Performed by: STUDENT IN AN ORGANIZED HEALTH CARE EDUCATION/TRAINING PROGRAM

## 2025-02-11 PROCEDURE — 1160F RVW MEDS BY RX/DR IN RCRD: CPT | Mod: CPTII,S$GLB,, | Performed by: STUDENT IN AN ORGANIZED HEALTH CARE EDUCATION/TRAINING PROGRAM

## 2025-02-11 PROCEDURE — 3044F HG A1C LEVEL LT 7.0%: CPT | Mod: CPTII,S$GLB,, | Performed by: STUDENT IN AN ORGANIZED HEALTH CARE EDUCATION/TRAINING PROGRAM

## 2025-02-11 NOTE — PROGRESS NOTES
GENERAL NEUROLOGY VISIT   Date: 2/11/25  Patient Name: Jose Horton   MRN: [id number]   PCP: Colt Will  Referring Provider: Colt Will MD    History:    Patient is a 59 y.o.  male who was referred for neuropathy evaluation.   PMHx of mixed HLD.   Referred for ulnar neuropathy of right upper extremity.  Sent for EMG/NCS     Patient reported numbness/tingling in the ulnar distribution in the ring and pinky and going up to the elbow since last January, consistent , nothing worsen it or make it better. No pain reported.  He is taking ibuprofen for knee pain which reported helps.     Primary doctor send him for an EMG/nerve conduction study would has not done yet, we will help with schedule it.     Otherwise no other complaints.      Past Medical History:   Diagnosis Date    High cholesterol        Past Surgical History:   Procedure Laterality Date    COLONOSCOPY N/A 09/08/2023    COLONOSCOPY N/A 9/8/2023    Procedure: COLONOSCOPY;  Surgeon: Nakia Banegas MD;  [facility];  Service: Endoscopy;  Laterality: N/A;       Social History     Socioeconomic History    Marital status:     Number of children: 2   Tobacco Use    Smoking status: Never    Smokeless tobacco: Never   Substance and Sexual Activity    Drug use: Never    Sexual activity: Yes     Partners: Female     Comment: single partner     Social Drivers of Health     Financial Resource Strain: Low Risk  (12/23/2024)    Overall Financial Resource Strain (CARDIA)     Difficulty of Paying Living Expenses: Not very hard   Food Insecurity: No Food Insecurity (12/23/2024)    Hunger Vital Sign     Worried About Running Out of Food in the Last Year: Never true     Ran Out of Food in the Last Year: Never true   Physical Activity: Insufficiently Active (12/23/2024)    Exercise Vital Sign     Days of Exercise per Week: 1 day     Minutes of Exercise per Session: 30 min   Stress: Stress Concern Present (12/23/2024)    [employer] [place] [place] [employer]  "[employer] - Occupational Stress Questionnaire     Feeling of Stress : To some extent   Housing Stability: High Risk (12/23/2024)    Housing Stability Vital Sign     Unable to Pay for Housing in the Last Year: Yes       Review of patient's allergies indicates:  No Known Allergies    Current Outpatient Medications on File Prior to Visit   Medication Sig Dispense Refill    ibuprofen (ADVIL,MOTRIN) 200 MG tablet Take 200 mg by mouth every 8 (eight) hours as needed for Pain.      rosuvastatin (CRESTOR) 5 MG tablet Take 1 tablet (5 mg total) by mouth once daily. 90 tablet 3    cetirizine (ZYRTEC) 10 MG tablet Take 1 tablet (10 mg total) by mouth once daily. (Patient not taking: Reported on 2/11/2025) 30 tablet 3     No current facility-administered medications on file prior to visit.        Family history:  Family History   Problem Relation Name Age of Onset    Dementia Mother          alzheimer, alcoholism    Dementia Father      Dementia Paternal Grandmother      Dementia Paternal Grandfather         Review Of Systems     Constitutional Negative for fevers, chills, weigh loss   HEENT Negative for hearing loss, dysphagia, sore throat, diplopia   Respiratory Negative for shortness of breath, cough    Cardiovascular Negative for chest pain, palpitations    Gastrointestinal Negative for constipation, diarrhea, early satiety    Skin Negative for rashes    Musculoskeletal Negative for joint pains, myalgias.   Neurological See Above    Psychological Negative for sleep disturbances.    Heme/Lymph Negative for easy bruising, easy bleeding    Endocrine Negative for polyuria, polydypsia     Physical Exam:     Physical Examination    Vitals: /85 (BP Location: Left arm, Patient Position: Sitting)   Pulse 80   Ht 5' 10" (1.778 m)   Wt 121 kg (266 lb 12.8 oz)   BMI 38.28 kg/m²       NEURO    Neurological Exam  Mental Status:  Alert and oriented to person, place and time, recent and remote memory intact, normal attention span " and fund of knowledge; Speech is spontaneous and fluent     Cranial Nerve exam:  II: Visual fields full to confrontation; Pupils equal round and reactive about 3mm  III, IV, VI: Extraoccular movements intact with no nystagmus  V: Sensation in V1, V2, V3 intact to light-touch bilaterally,  VII:  No facial weakness,  VIII: Hearing grossly intact  IX,X: palate elevation symmetric   XI: SCM & Trapezius normal,  XII: tongue midline, normal morphology, tongue movement normal     Motor Exam: No involuntary movement. Normal tone and bulk in all 4 extremities  Strength: 5/5 throughout   Reflexes: 2+ throughout    Sensory Exam:  Decreased sensation in the right hand ulnar distribution up to the elbow comparing to the lateral side.  Cerebellar Sign: Normal Finger-to-nose,  bilaterally.  Gait:  Normal steady physiologic gait with normal arm swinging on both side    Interval/Previous Work-up:   Reviewed      Assessment and Plan:   Jose Horton is a 59 y.o. male presenting 1 year ulnar nerve distribution numbness and tingling.  Consistent, nothing makes it worse or makes it better, no pain, weakness.  EMG nerve conduction study is bending.  Patient already on ibuprofen      Problem List Items Addressed This Visit    None  Visit Diagnoses       Ulnar neuropathy of right upper extremity              -- Discuss further management discussion after EMG nerve conduction study  -- Other recommendations  -- Conservative Treatments (Non-Surgical)  Rest and Avoidance of Aggravating Activities: Avoid positions or activities that put pressure on the elbow, such as prolonged elbow flexion (bending) or resting on hard surfaces.  Splinting or Bracing: Wearing a splint at night to keep the elbow in a neutral position and prevent excessive bending during sleep.  Physical Therapy: Exercises to strengthen the muscles around the elbow and wrist, and stretches to relieve tension on the ulnar nerve.  Anti-inflammatory Medications:  Over-the-counter pain relievers, such as ibuprofen or naproxen, may help reduce pain and swelling.  Ergonomic Modifications: Making changes to daily activities (such as adjusting how you sit at a desk) to reduce pressure on the ulnar nerve.          -- Other options like steroid injection, surgical management discussed for the future if indicated.     RTC after EMG/nerve conduction study done    Time spent on this encounter: 25 minutes. This includes face to face time and non-face to face time preparing to see the patient (eg, review of tests), obtaining and/or reviewing separately obtained history, documenting clinical information in the electronic or other health record, independently interpreting results and communicating results to the patient/family/caregiver, or care coordinator.       A dictation device was used to produce this document. Use of such devices sometimes results in grammatical errors or replacement of words that sound similarly.     Sandi Cabrera MD, M.B.Ch.B  Neurology, Vascular neurology  [facility]

## 2025-07-11 ENCOUNTER — TELEPHONE (OUTPATIENT)
Dept: INTERNAL MEDICINE | Facility: CLINIC | Age: 60
End: 2025-07-11
Payer: COMMERCIAL

## 2025-07-11 ENCOUNTER — OFFICE VISIT (OUTPATIENT)
Dept: INTERNAL MEDICINE | Facility: CLINIC | Age: 60
End: 2025-07-11
Payer: COMMERCIAL

## 2025-07-11 VITALS
BODY MASS INDEX: 38.34 KG/M2 | WEIGHT: 267.19 LBS | DIASTOLIC BLOOD PRESSURE: 76 MMHG | HEART RATE: 77 BPM | OXYGEN SATURATION: 99 % | SYSTOLIC BLOOD PRESSURE: 128 MMHG

## 2025-07-11 DIAGNOSIS — M25.561 CHRONIC PAIN OF RIGHT KNEE: Primary | ICD-10-CM

## 2025-07-11 DIAGNOSIS — G89.29 CHRONIC PAIN OF RIGHT KNEE: Primary | ICD-10-CM

## 2025-07-11 PROCEDURE — 99999 PR PBB SHADOW E&M-EST. PATIENT-LVL IV: CPT | Mod: PBBFAC,,, | Performed by: STUDENT IN AN ORGANIZED HEALTH CARE EDUCATION/TRAINING PROGRAM

## 2025-07-11 RX ORDER — METHYLPREDNISOLONE 4 MG/1
TABLET ORAL
Qty: 21 EACH | Refills: 0 | Status: SHIPPED | OUTPATIENT
Start: 2025-07-11 | End: 2025-08-01

## 2025-07-11 NOTE — TELEPHONE ENCOUNTER
Copied from CRM #[id number]. Topic: Appointments - Appointment Scheduling  >> Jul 11, 2025  9:40 AM Estrellita wrote:  Symptom: Leg Pain - Not From Injury  Outcome: Schedule an appointment to be seen within 24 hours.  Reason: Caller denied all higher acuity questions  .1MEDICALADVICE     Patient is calling for Medical Advice regarding:pt wife is calling Mae would like pt to be seen today if possible.  Nothing was coming up for today for any providers. The soonest is wed 7/16/25.  Please call pt back and advise.    How long has patient had these symptoms:    Pharmacy name and phone#:    Patient wants a call back or thru [facility], provide patient's call back phone number:Mae martinez [phone]    Comments:    Please advise patient replies from provider may take up to 48 hours.

## 2025-07-11 NOTE — PROGRESS NOTES
SUBJECTIVE     Chief Complaint   Patient presents with    Knee Pain       HPI  Jose Horton is a 59 y.o. male with HLD that presents for evaluation of leg pain.     This is a new patient to me but established to [facility]. PCP is Colt Will MD.     MUSCULOSKELETAL PAIN:  He reports bilateral lower extremity pain that has progressively worsened over the last month with significant exacerbation in the past week. Right knee pain is predominantly located on the lateral and anterior aspects. Left ankle pain is located on the lateral aspect. He describes both pains as muscular in nature. His right knee becomes weak during ambulation, requiring him to stop and stretch before continuing. He experiences constant muscle stiffness in the entire right leg and reports severe pain with knee extension and pressure. His left ankle pain is causing significant weakness affecting his ability to walk. He notes minimal swelling in the right knee.    IMAGING:  X-rays of both right knee and left ankle performed in December showed no bone abnormalities.    TREATMENT HISTORY:  Previous physical therapy provided slight improvement for his right knee pain. He currently manages pain with alternating ibuprofen and acetaminophen.    PAST MEDICAL HISTORY:  He previously took cholesterol medication but discontinued after one week due to joint pain.         PAST MEDICAL HISTORY:  Past Medical History:   Diagnosis Date    High cholesterol        PAST SURGICAL HISTORY:  Past Surgical History:   Procedure Laterality Date    COLONOSCOPY N/A 09/08/2023    COLONOSCOPY N/A 9/8/2023    Procedure: COLONOSCOPY;  Surgeon: Nakia Banegas MD;  Location: [facility];  Service: Endoscopy;  Laterality: N/A;       FAMILY HISTORY:  Family History   Problem Relation Name Age of Onset    Dementia Mother          alzheimer, alcoholism    Dementia Father      Dementia Paternal Grandmother      Dementia Paternal Grandfather         ALLERGIES AND MEDICATIONS:  updated and reviewed.  Review of patient's allergies indicates:  No Known Allergies  Current Medications[1]    ROS  Review of Systems   Constitutional: Negative.  Negative for chills and fever.   HENT: Negative.  Negative for congestion and sore throat.    Respiratory:  Negative for chest tightness and shortness of breath.    Cardiovascular:  Negative for chest pain and palpitations.   Gastrointestinal:  Negative for abdominal pain, constipation and diarrhea.   Genitourinary:  Negative for dysuria and frequency.   Musculoskeletal:  Positive for arthralgias and joint swelling. Negative for back pain and neck pain.   Skin: Negative.    Neurological:  Negative for dizziness, syncope and headaches.   Psychiatric/Behavioral: Negative.           OBJECTIVE     Physical Exam  Vitals:    07/11/25 1325   BP: 128/76   Pulse: 77    Body mass index is 38.34 kg/m².  Weight: 121.2 kg (267 lb 3.2 oz)         Physical Exam  Vitals reviewed.   Constitutional:       General: He is not in acute distress.     Appearance: Normal appearance.   HENT:      Head: Normocephalic and atraumatic.      Mouth/Throat:      Mouth: Mucous membranes are moist.      Pharynx: Oropharynx is clear.   Eyes:      Extraocular Movements: Extraocular movements intact.      Conjunctiva/sclera: Conjunctivae normal.      Pupils: Pupils are equal, round, and reactive to light.   Cardiovascular:      Rate and Rhythm: Normal rate.      Pulses: Normal pulses.   Pulmonary:      Effort: Pulmonary effort is normal.   Abdominal:      General: There is no distension.   Musculoskeletal:         General: Normal range of motion.      Cervical back: Normal range of motion.      Right knee: Effusion present. No deformity or crepitus. Normal range of motion. No tenderness. MCL laxity present. No LCL laxity.      Instability Tests: Anterior drawer test negative. Posterior drawer test negative.      Left knee: No crepitus. Normal range of motion. No tenderness. No LCL laxity or  MCL laxity.     Instability Tests: Anterior drawer test negative. Posterior drawer test negative.   Skin:     General: Skin is warm and dry.   Neurological:      Mental Status: He is alert.           ASSESSMENT     59 y.o. male with     1. Chronic pain of right knee        PLAN:     1. Chronic pain of right knee  - With acute flare. Send medrol dosepack.   - X-ray in 12/2024 negative.   - Pain continues despite course of physical therapy.   - MRI Knee Without Contrast Right; Future  - Ambulatory referral/consult to Orthopedics; Future  - methylPREDNISolone (MEDROL DOSEPACK) 4 mg tablet; use as directed  Dispense: 21 each; Refill: 0      RTC PRN       Quang Stevens MD  Family Medicine [facility]  07/11/2025    This note was generated with the assistance of ambient listening technology. Verbal consent was obtained by the patient and accompanying visitor(s) for the recording of patient appointment to facilitate this note. I attest to having reviewed and edited the generated note for accuracy, though some syntax or spelling errors may persist. Please contact the author of this note for any clarification.      Follow up if symptoms worsen or fail to improve.                       [1]   Current Outpatient Medications   Medication Sig Dispense Refill    cetirizine (ZYRTEC) 10 MG tablet Take 1 tablet (10 mg total) by mouth once daily. 30 tablet 3    ibuprofen (ADVIL,MOTRIN) 200 MG tablet Take 200 mg by mouth every 8 (eight) hours as needed for Pain.      rosuvastatin (CRESTOR) 5 MG tablet Take 1 tablet (5 mg total) by mouth once daily. 90 tablet 3    methylPREDNISolone (MEDROL DOSEPACK) 4 mg tablet use as directed 21 each 0     No current facility-administered medications for this visit.

## 2025-07-12 ENCOUNTER — HOSPITAL ENCOUNTER (OUTPATIENT)
Dept: RADIOLOGY | Facility: HOSPITAL | Age: 60
Discharge: HOME | End: 2025-07-12
Attending: STUDENT IN AN ORGANIZED HEALTH CARE EDUCATION/TRAINING PROGRAM
Payer: COMMERCIAL

## 2025-07-12 DIAGNOSIS — M25.561 CHRONIC PAIN OF RIGHT KNEE: ICD-10-CM

## 2025-07-12 DIAGNOSIS — G89.29 CHRONIC PAIN OF RIGHT KNEE: ICD-10-CM

## 2025-07-12 PROCEDURE — 73721 MRI JNT OF LWR EXTRE W/O DYE: CPT | Mod: TC,RT

## 2025-07-12 PROCEDURE — 73721 MRI JNT OF LWR EXTRE W/O DYE: CPT | Mod: 26,RT,, | Performed by: RADIOLOGY

## 2025-07-15 ENCOUNTER — HOSPITAL ENCOUNTER (OUTPATIENT)
Dept: RADIOLOGY | Facility: HOSPITAL | Age: 60
Discharge: HOME | End: 2025-07-15
Payer: COMMERCIAL

## 2025-07-15 ENCOUNTER — OFFICE VISIT (OUTPATIENT)
Dept: ORTHOPEDICS | Facility: CLINIC | Age: 60
End: 2025-07-15
Payer: COMMERCIAL

## 2025-07-15 VITALS — HEIGHT: 70 IN | BODY MASS INDEX: 37.84 KG/M2 | WEIGHT: 264.31 LBS

## 2025-07-15 DIAGNOSIS — M25.561 CHRONIC PAIN OF RIGHT KNEE: ICD-10-CM

## 2025-07-15 DIAGNOSIS — S83.231A COMPLEX TEAR OF MEDIAL MENISCUS OF RIGHT KNEE AS CURRENT INJURY, INITIAL ENCOUNTER: ICD-10-CM

## 2025-07-15 DIAGNOSIS — M17.11 PRIMARY OSTEOARTHRITIS OF RIGHT KNEE: Primary | ICD-10-CM

## 2025-07-15 DIAGNOSIS — G89.29 CHRONIC PAIN OF RIGHT KNEE: ICD-10-CM

## 2025-07-15 PROCEDURE — 3008F BODY MASS INDEX DOCD: CPT | Mod: CPTII,S$GLB,,

## 2025-07-15 PROCEDURE — 1159F MED LIST DOCD IN RCRD: CPT | Mod: CPTII,S$GLB,,

## 2025-07-15 PROCEDURE — 99213 OFFICE O/P EST LOW 20 MIN: CPT | Mod: S$GLB,,,

## 2025-07-15 PROCEDURE — 73562 X-RAY EXAM OF KNEE 3: CPT | Mod: 26,RT,, | Performed by: RADIOLOGY

## 2025-07-15 PROCEDURE — 3044F HG A1C LEVEL LT 7.0%: CPT | Mod: CPTII,S$GLB,,

## 2025-07-15 PROCEDURE — 99999 PR PBB SHADOW E&M-EST. PATIENT-LVL III: CPT | Mod: PBBFAC,,,

## 2025-07-15 PROCEDURE — 1160F RVW MEDS BY RX/DR IN RCRD: CPT | Mod: CPTII,S$GLB,,

## 2025-07-15 PROCEDURE — 73562 X-RAY EXAM OF KNEE 3: CPT | Mod: TC,RT

## 2025-07-15 NOTE — PROGRESS NOTES
SUBJECTIVE:     Chief Complaint & History of Present Illness:  History of Present Illness    CHIEF COMPLAINT:  - Right knee pain    HPI:  Mr. Horton presents with right knee pain that started in December when he twisted his knee while lifting something heavy out of his truck. Initially not significant, the pain has worsened over the last month, becoming more severe in the past week. Pain is located more on the outside of the knee, with tenderness on palpation and a feeling of weakness. He reports some swelling, though he could not definitively say one knee was much bigger than the other.    His pain has escalated to the point where he could barely walk and felt like he was going to fall when getting out of the car. It is worse after periods of immobility, particularly in the mornings and after sitting for extended periods, especially when driving. He reports that the pain intensifies when the knee is squeezed and then stretched. Sitting in a recliner with feet raised helps relieve pain somewhat.    He has been using OTC anti-inflammatory medication daily to manage pain. He was prescribed a Medrol Dosepak (oral steroids) last Friday, which he started taking on Sunday. This, along with acetaminophen arthritis medication, has helped significantly. He has had PT for the knee, which helped a little.    He bought a cane on Saturday to help with mobility. His pain has worsened after changing jobs to , which requires him to be on his feet for 8 hours. He denies any clicking in the knee but mentions normal age-related creaking in the morning that does not cause pain.    He denies any issues with the left knee, or previous surgeries or injections. He denies any history of significant knee problems prior to this incident.    PREVIOUS TREATMENTS:  - PT for the right knee: Helped a little  - Cane use: Since Saturday to help with walking  - Feet elevation in a recliner: At home to relieve pain  - Ice and heat  "application: For comfort    MEDICATIONS:  - Medrol Dosepak (prednisone): Started on Sunday, providing significant benefit  - Acetaminophen arthritis: Providing significant benefit  - Ibuprofen: Daily    WORK STATUS:  - Works in   - Job requires standing for 8 hours a day  - Recently changed jobs  - After a week of work, knee pain became severe  - Could barely walk and needed to use a cane for assistance      ROS:  Musculoskeletal: +joint pain, +joint swelling, +joint stiffness, +limb pain, +muscle weakness, +pain with movement, +difficulty walking, +difficulty standing up          Past Medical History:   Diagnosis Date    High cholesterol        Past Surgical History:   Procedure Laterality Date    COLONOSCOPY N/A 09/08/2023    COLONOSCOPY N/A 9/8/2023    Procedure: COLONOSCOPY;  Surgeon: Nakia Banegas MD;  Location: [facility];  Service: Endoscopy;  Laterality: N/A;       Family History   Problem Relation Name Age of Onset    Dementia Mother          alzheimer, alcoholism    Dementia Father      Dementia Paternal Grandmother      Dementia Paternal Grandfather         Review of patient's allergies indicates:  No Known Allergies      Current Medications[1]      OBJECTIVE:     PHYSICAL EXAM:  Ht 5' 10" (1.778 m)   Wt 119.9 kg (264 lb 5.3 oz)   BMI 37.93 kg/m²   General: Pleasant, cooperative, NAD.  HEENT: NCAT, sclera nonicteric.  Lungs: Respirations are equal and unlabored.   Abdomen: Soft and non-tender.  CV: 2+ bilateral upper and lower extremity pulses.  Neuro: Sensation intact to light touch.  Skin: Intact throughout LE with no rashes, erythema, or lesions.  Extremities: No LE edema, NVI lower extremities. antalgic gait.    right Knee Exam:  Knee Range of Motion: 0-120   Effusion: none  Condition of skin: intact  Location of tenderness: Medial joint line and Lateral joint line   Strength: 5/5 quadriceps strength, 5/5 gastroc-soleus strength, 5/5 hamstring strength, and 5/5 tibialis anterior " strength  Stability: stable to testing  Knee Alignment: normal  Monse: positive medial    RADIOGRAPHS:  X-rays of the right knee taken today personally reviewed. Imaging reveals no acute fractures or dislocations. Mild medial compartment osteoarthritic changes.    Recent R knee MRI reveals lateral compartment osteoarthritic changes as well as complex tear of the posterior horn medial meniscus.       ASSESSMENT:       ICD-10-CM ICD-9-CM   1. Primary osteoarthritis of right knee  M17.11 715.16   2. Complex tear of medial meniscus of right knee as current injury, initial encounter  S83.231A 836.0   3. Chronic pain of right knee  M25.561 719.46    G89.29 338.29       PLAN:     We discussed with the patient at length all the different treatment options available including anti-inflammatories, acetaminophen, rest, ice, knee strengthening exercise, occasional cortisone injections for temporary relief, Viscosupplimentation injections, arthroscopic debridement, osteotomy, and finally knee arthroplasty.     Assessment & Plan    - Discussed potential future right knee steroid injection if symptoms worsen.  - Ice knee for 20 minutes at a time while elevating.  - Elevate knee above heart level to control swelling.  - Apply heat before exercise to loosen up if planning to be active.  - Continue moving within reason.  - Continue Medrol Dosepak.  - Take Tylenol arthritis as needed for pain.  - Follow up as needed for new or worsening symptoms. Consider injection.          This note was generated with the assistance of ambient listening technology. Verbal consent was obtained by the patient and accompanying visitor(s) for the recording of patient appointment to facilitate this note. I attest to having reviewed and edited the generated note for accuracy, though some syntax or spelling errors may persist. Please contact the author of this note for any clarification.         Alfred Casiano PA-C         [1]   Current Outpatient  Medications:     cetirizine (ZYRTEC) 10 MG tablet, Take 1 tablet (10 mg total) by mouth once daily., Disp: 30 tablet, Rfl: 3    ibuprofen (ADVIL,MOTRIN) 200 MG tablet, Take 200 mg by mouth every 8 (eight) hours as needed for Pain., Disp: , Rfl:     methylPREDNISolone (MEDROL DOSEPACK) 4 mg tablet, use as directed, Disp: 21 each, Rfl: 0    rosuvastatin (CRESTOR) 5 MG tablet, Take 1 tablet (5 mg total) by mouth once daily., Disp: 90 tablet, Rfl: 3

## 2025-08-24 ENCOUNTER — HOSPITAL ENCOUNTER (OUTPATIENT)
Facility: HOSPITAL | Age: 60
Discharge: HOME | End: 2025-08-25
Attending: EMERGENCY MEDICINE | Admitting: EMERGENCY MEDICINE
Payer: COMMERCIAL

## 2025-08-24 DIAGNOSIS — N17.9 AKI (ACUTE KIDNEY INJURY): ICD-10-CM

## 2025-08-24 DIAGNOSIS — T67.5XXA HEAT EXHAUSTION, INITIAL ENCOUNTER: Primary | ICD-10-CM

## 2025-08-24 DIAGNOSIS — R25.2 CRAMPING OF HANDS: ICD-10-CM

## 2025-08-24 LAB
ABSOLUTE EOSINOPHIL (OHS): 0.02 K/UL
ABSOLUTE MONOCYTE (OHS): 0.46 K/UL (ref 0.3–1)
ABSOLUTE NEUTROPHIL COUNT (OHS): 7.18 K/UL (ref 1.8–7.7)
ALBUMIN SERPL BCP-MCNC: 4.9 G/DL (ref 3.5–5.2)
ALP SERPL-CCNC: 71 UNIT/L (ref 40–150)
ALT SERPL W/O P-5'-P-CCNC: 39 UNIT/L (ref 0–55)
ANION GAP (OHS): 19 MMOL/L (ref 8–16)
AST SERPL-CCNC: 31 UNIT/L (ref 0–50)
BACTERIA #/AREA URNS AUTO: ABNORMAL /HPF
BASOPHILS # BLD AUTO: 0.01 K/UL
BASOPHILS NFR BLD AUTO: 0.1 %
BILIRUB SERPL-MCNC: 0.8 MG/DL (ref 0.1–1)
BILIRUB UR QL STRIP.AUTO: NEGATIVE
BUN SERPL-MCNC: 25 MG/DL (ref 6–20)
BUN SERPL-MCNC: 29 MG/DL (ref 6–30)
CALCIUM SERPL-MCNC: 10.5 MG/DL (ref 8.7–10.5)
CHLORIDE SERPL-SCNC: 110 MMOL/L (ref 95–110)
CHLORIDE SERPL-SCNC: 111 MMOL/L (ref 95–110)
CK SERPL-CCNC: 690 U/L (ref 20–200)
CLARITY UR: ABNORMAL
CO2 SERPL-SCNC: 15 MMOL/L (ref 23–29)
COLOR UR AUTO: YELLOW
CREAT SERPL-MCNC: 2.7 MG/DL (ref 0.5–1.4)
CREAT SERPL-MCNC: 2.8 MG/DL (ref 0.5–1.4)
ERYTHROCYTE [DISTWIDTH] IN BLOOD BY AUTOMATED COUNT: 13.4 % (ref 11.5–14.5)
GFR SERPLBLD CREATININE-BSD FMLA CKD-EPI: 26 ML/MIN/1.73/M2
GLUCOSE SERPL-MCNC: 126 MG/DL (ref 70–110)
GLUCOSE SERPL-MCNC: 126 MG/DL (ref 70–110)
GLUCOSE UR QL STRIP: NEGATIVE
HCT VFR BLD AUTO: 45.1 % (ref 40–54)
HCT VFR BLD CALC: 46 %PCV (ref 36–54)
HGB BLD-MCNC: 14.7 GM/DL (ref 14–18)
HGB UR QL STRIP: ABNORMAL
HOLD SPECIMEN: NORMAL
HYALINE CASTS UR QL AUTO: 66 /LPF (ref 0–1)
IMM GRANULOCYTES # BLD AUTO: 0.03 K/UL (ref 0–0.04)
IMM GRANULOCYTES NFR BLD AUTO: 0.3 % (ref 0–0.5)
KETONES UR QL STRIP: NEGATIVE
LEUKOCYTE ESTERASE UR QL STRIP: NEGATIVE
LYMPHOCYTES # BLD AUTO: 2.04 K/UL (ref 1–4.8)
MAGNESIUM SERPL-MCNC: 2.1 MG/DL (ref 1.6–2.6)
MCH RBC QN AUTO: 28.1 PG (ref 27–31)
MCHC RBC AUTO-ENTMCNC: 32.6 G/DL (ref 32–36)
MCV RBC AUTO: 86 FL (ref 82–98)
MICROSCOPIC COMMENT: ABNORMAL
NITRITE UR QL STRIP: NEGATIVE
NUCLEATED RBC (/100WBC) (OHS): 0 /100 WBC
PH UR STRIP: 6 [PH]
PLATELET # BLD AUTO: 224 K/UL (ref 150–450)
PMV BLD AUTO: 11.2 FL (ref 9.2–12.9)
POC IONIZED CALCIUM: 1.2 MMOL/L (ref 1.06–1.42)
POC TCO2 (MEASURED): 18 MMOL/L (ref 23–29)
POTASSIUM BLD-SCNC: 3.8 MMOL/L (ref 3.5–5.1)
POTASSIUM SERPL-SCNC: 3.5 MMOL/L (ref 3.5–5.1)
PROT SERPL-MCNC: 8.5 GM/DL (ref 6–8.4)
PROT UR QL STRIP: ABNORMAL
RBC # BLD AUTO: 5.24 M/UL (ref 4.6–6.2)
RBC #/AREA URNS AUTO: 4 /HPF (ref 0–4)
RELATIVE EOSINOPHIL (OHS): 0.2 %
RELATIVE LYMPHOCYTE (OHS): 20.9 % (ref 18–48)
RELATIVE MONOCYTE (OHS): 4.7 % (ref 4–15)
RELATIVE NEUTROPHIL (OHS): 73.8 % (ref 38–73)
SAMPLE: ABNORMAL
SODIUM BLD-SCNC: 143 MMOL/L (ref 136–145)
SODIUM SERPL-SCNC: 145 MMOL/L (ref 136–145)
SP GR UR STRIP: 1.02
SQUAMOUS #/AREA URNS AUTO: 1 /HPF
UROBILINOGEN UR STRIP-ACNC: NEGATIVE EU/DL
WBC # BLD AUTO: 9.74 K/UL (ref 3.9–12.7)
WBC #/AREA URNS AUTO: 2 /HPF (ref 0–5)

## 2025-08-24 PROCEDURE — 93005 ELECTROCARDIOGRAM TRACING: CPT

## 2025-08-24 PROCEDURE — 81001 URINALYSIS AUTO W/SCOPE: CPT | Performed by: EMERGENCY MEDICINE

## 2025-08-24 PROCEDURE — 80053 COMPREHEN METABOLIC PANEL: CPT | Performed by: EMERGENCY MEDICINE

## 2025-08-24 PROCEDURE — 99284 EMERGENCY DEPT VISIT MOD MDM: CPT | Mod: 25

## 2025-08-24 PROCEDURE — 99285 EMERGENCY DEPT VISIT HI MDM: CPT | Mod: 25

## 2025-08-24 PROCEDURE — 63600175 PHARM REV CODE 636 W HCPCS: Performed by: EMERGENCY MEDICINE

## 2025-08-24 PROCEDURE — 80047 BASIC METABLC PNL IONIZED CA: CPT

## 2025-08-24 PROCEDURE — 85025 COMPLETE CBC W/AUTO DIFF WBC: CPT | Performed by: EMERGENCY MEDICINE

## 2025-08-24 PROCEDURE — 96374 THER/PROPH/DIAG INJ IV PUSH: CPT

## 2025-08-24 PROCEDURE — 82550 ASSAY OF CK (CPK): CPT | Performed by: EMERGENCY MEDICINE

## 2025-08-24 PROCEDURE — 96361 HYDRATE IV INFUSION ADD-ON: CPT

## 2025-08-24 PROCEDURE — G0378 HOSPITAL OBSERVATION PER HR: HCPCS

## 2025-08-24 PROCEDURE — 25000003 PHARM REV CODE 250: Performed by: EMERGENCY MEDICINE

## 2025-08-24 PROCEDURE — 83735 ASSAY OF MAGNESIUM: CPT | Performed by: EMERGENCY MEDICINE

## 2025-08-24 PROCEDURE — 93010 ELECTROCARDIOGRAM REPORT: CPT | Mod: ,,, | Performed by: INTERNAL MEDICINE

## 2025-08-24 RX ORDER — ACETAMINOPHEN 500 MG
1000 TABLET ORAL EVERY 8 HOURS PRN
Status: DISCONTINUED | OUTPATIENT
Start: 2025-08-24 | End: 2025-08-25 | Stop reason: HOSPADM

## 2025-08-24 RX ORDER — DIAZEPAM 10 MG/2ML
2 INJECTION INTRAMUSCULAR
Status: COMPLETED | OUTPATIENT
Start: 2025-08-24 | End: 2025-08-24

## 2025-08-24 RX ORDER — TALC
6 POWDER (GRAM) TOPICAL NIGHTLY PRN
Status: DISCONTINUED | OUTPATIENT
Start: 2025-08-24 | End: 2025-08-25 | Stop reason: HOSPADM

## 2025-08-24 RX ORDER — SODIUM CHLORIDE 0.9 % (FLUSH) 0.9 %
10 SYRINGE (ML) INJECTION
Status: DISCONTINUED | OUTPATIENT
Start: 2025-08-24 | End: 2025-08-25 | Stop reason: HOSPADM

## 2025-08-24 RX ORDER — SODIUM CHLORIDE, SODIUM LACTATE, POTASSIUM CHLORIDE, CALCIUM CHLORIDE 600; 310; 30; 20 MG/100ML; MG/100ML; MG/100ML; MG/100ML
1000 INJECTION, SOLUTION INTRAVENOUS
Status: COMPLETED | OUTPATIENT
Start: 2025-08-24 | End: 2025-08-25

## 2025-08-24 RX ORDER — ONDANSETRON HYDROCHLORIDE 2 MG/ML
4 INJECTION, SOLUTION INTRAVENOUS EVERY 8 HOURS PRN
Status: DISCONTINUED | OUTPATIENT
Start: 2025-08-24 | End: 2025-08-25 | Stop reason: HOSPADM

## 2025-08-24 RX ADMIN — SODIUM CHLORIDE, POTASSIUM CHLORIDE, SODIUM LACTATE AND CALCIUM CHLORIDE 1000 ML: 600; 310; 30; 20 INJECTION, SOLUTION INTRAVENOUS at 09:08

## 2025-08-24 RX ADMIN — SODIUM CHLORIDE 1000 ML: 9 INJECTION, SOLUTION INTRAVENOUS at 06:08

## 2025-08-24 RX ADMIN — DIAZEPAM 2 MG: 5 INJECTION, SOLUTION INTRAMUSCULAR; INTRAVENOUS at 09:08

## 2025-08-24 RX ADMIN — SODIUM CHLORIDE 1000 ML: 9 INJECTION, SOLUTION INTRAVENOUS at 07:08

## 2025-08-24 RX ADMIN — POTASSIUM BICARBONATE 50 MEQ: 978 TABLET, EFFERVESCENT ORAL at 08:08

## 2025-08-25 VITALS
BODY MASS INDEX: 37.8 KG/M2 | WEIGHT: 264 LBS | TEMPERATURE: 98 F | OXYGEN SATURATION: 100 % | SYSTOLIC BLOOD PRESSURE: 134 MMHG | DIASTOLIC BLOOD PRESSURE: 74 MMHG | HEIGHT: 70 IN | HEART RATE: 64 BPM | RESPIRATION RATE: 20 BRPM

## 2025-08-25 LAB
ALBUMIN SERPL BCP-MCNC: 3.9 G/DL (ref 3.5–5.2)
ALP SERPL-CCNC: 55 UNIT/L (ref 40–150)
ALT SERPL W/O P-5'-P-CCNC: 32 UNIT/L (ref 0–55)
ANION GAP (OHS): 10 MMOL/L (ref 8–16)
ANION GAP (OHS): 9 MMOL/L (ref 8–16)
AST SERPL-CCNC: 34 UNIT/L (ref 0–50)
BILIRUB SERPL-MCNC: 0.8 MG/DL (ref 0.1–1)
BUN SERPL-MCNC: 21 MG/DL (ref 6–20)
BUN SERPL-MCNC: 22 MG/DL (ref 6–20)
CALCIUM SERPL-MCNC: 7.5 MG/DL (ref 8.7–10.5)
CALCIUM SERPL-MCNC: 9.2 MG/DL (ref 8.7–10.5)
CHLORIDE SERPL-SCNC: 112 MMOL/L (ref 95–110)
CHLORIDE SERPL-SCNC: 116 MMOL/L (ref 95–110)
CK SERPL-CCNC: 1720 U/L (ref 20–200)
CK SERPL-CCNC: 2177 U/L (ref 20–200)
CO2 SERPL-SCNC: 19 MMOL/L (ref 23–29)
CO2 SERPL-SCNC: 20 MMOL/L (ref 23–29)
CREAT SERPL-MCNC: 1.2 MG/DL (ref 0.5–1.4)
CREAT SERPL-MCNC: 1.6 MG/DL (ref 0.5–1.4)
GFR SERPLBLD CREATININE-BSD FMLA CKD-EPI: 49 ML/MIN/1.73/M2
GFR SERPLBLD CREATININE-BSD FMLA CKD-EPI: >60 ML/MIN/1.73/M2
GLUCOSE SERPL-MCNC: 103 MG/DL (ref 70–110)
GLUCOSE SERPL-MCNC: 88 MG/DL (ref 70–110)
HOLD SPECIMEN: NORMAL
HOLD SPECIMEN: NORMAL
OHS QRS DURATION: 102 MS
OHS QTC CALCULATION: 439 MS
POTASSIUM SERPL-SCNC: 3.5 MMOL/L (ref 3.5–5.1)
POTASSIUM SERPL-SCNC: 4.3 MMOL/L (ref 3.5–5.1)
PROT SERPL-MCNC: 6.8 GM/DL (ref 6–8.4)
SODIUM SERPL-SCNC: 142 MMOL/L (ref 136–145)
SODIUM SERPL-SCNC: 144 MMOL/L (ref 136–145)

## 2025-08-25 PROCEDURE — 80053 COMPREHEN METABOLIC PANEL: CPT | Performed by: PHYSICIAN ASSISTANT

## 2025-08-25 PROCEDURE — 82374 ASSAY BLOOD CARBON DIOXIDE: CPT

## 2025-08-25 PROCEDURE — 96361 HYDRATE IV INFUSION ADD-ON: CPT

## 2025-08-25 PROCEDURE — 82550 ASSAY OF CK (CPK): CPT | Mod: 91

## 2025-08-25 PROCEDURE — G0378 HOSPITAL OBSERVATION PER HR: HCPCS

## 2025-08-25 PROCEDURE — 25000003 PHARM REV CODE 250

## 2025-08-25 RX ORDER — SODIUM CHLORIDE 9 MG/ML
INJECTION, SOLUTION INTRAVENOUS CONTINUOUS
Status: DISCONTINUED | OUTPATIENT
Start: 2025-08-25 | End: 2025-08-25 | Stop reason: HOSPADM

## 2025-08-25 RX ORDER — METHOCARBAMOL 1000 MG/1
1000 TABLET, FILM COATED ORAL 2 TIMES DAILY PRN
Qty: 10 TABLET | Refills: 0 | Status: SHIPPED | OUTPATIENT
Start: 2025-08-25 | End: 2025-08-30

## 2025-08-25 RX ADMIN — SODIUM CHLORIDE: 0.9 INJECTION, SOLUTION INTRAVENOUS at 10:08

## 2025-08-26 ENCOUNTER — PATIENT OUTREACH (OUTPATIENT)
Dept: ADMINISTRATIVE | Facility: CLINIC | Age: 60
End: 2025-08-26
Payer: COMMERCIAL

## 2025-09-03 ENCOUNTER — OFFICE VISIT (OUTPATIENT)
Dept: INTERNAL MEDICINE | Facility: CLINIC | Age: 60
End: 2025-09-03
Payer: COMMERCIAL

## 2025-09-03 VITALS
WEIGHT: 262.13 LBS | BODY MASS INDEX: 37.53 KG/M2 | OXYGEN SATURATION: 98 % | DIASTOLIC BLOOD PRESSURE: 72 MMHG | SYSTOLIC BLOOD PRESSURE: 118 MMHG | HEART RATE: 75 BPM | HEIGHT: 70 IN

## 2025-09-03 DIAGNOSIS — E66.01 SEVERE OBESITY (BMI 35.0-39.9) WITH COMORBIDITY: ICD-10-CM

## 2025-09-03 DIAGNOSIS — Z09 HOSPITAL DISCHARGE FOLLOW-UP: Primary | ICD-10-CM

## 2025-09-03 DIAGNOSIS — N17.9 AKI (ACUTE KIDNEY INJURY): ICD-10-CM

## 2025-09-03 DIAGNOSIS — E78.5 DYSLIPIDEMIA: ICD-10-CM

## 2025-09-03 PROCEDURE — 99999 PR PBB SHADOW E&M-EST. PATIENT-LVL III: CPT | Mod: PBBFAC,,, | Performed by: PHYSICIAN ASSISTANT

## 2025-09-03 PROCEDURE — 1160F RVW MEDS BY RX/DR IN RCRD: CPT | Mod: CPTII,S$GLB,, | Performed by: PHYSICIAN ASSISTANT

## 2025-09-03 PROCEDURE — 3044F HG A1C LEVEL LT 7.0%: CPT | Mod: CPTII,S$GLB,, | Performed by: PHYSICIAN ASSISTANT

## 2025-09-03 PROCEDURE — 3078F DIAST BP <80 MM HG: CPT | Mod: CPTII,S$GLB,, | Performed by: PHYSICIAN ASSISTANT

## 2025-09-03 PROCEDURE — 99214 OFFICE O/P EST MOD 30 MIN: CPT | Mod: S$GLB,,, | Performed by: PHYSICIAN ASSISTANT

## 2025-09-03 PROCEDURE — 3074F SYST BP LT 130 MM HG: CPT | Mod: CPTII,S$GLB,, | Performed by: PHYSICIAN ASSISTANT

## 2025-09-03 PROCEDURE — 1159F MED LIST DOCD IN RCRD: CPT | Mod: CPTII,S$GLB,, | Performed by: PHYSICIAN ASSISTANT

## 2025-09-03 PROCEDURE — 3008F BODY MASS INDEX DOCD: CPT | Mod: CPTII,S$GLB,, | Performed by: PHYSICIAN ASSISTANT
